# Patient Record
Sex: FEMALE | Race: WHITE | NOT HISPANIC OR LATINO | Employment: OTHER | URBAN - METROPOLITAN AREA
[De-identification: names, ages, dates, MRNs, and addresses within clinical notes are randomized per-mention and may not be internally consistent; named-entity substitution may affect disease eponyms.]

---

## 2022-04-14 ENCOUNTER — HOSPITAL ENCOUNTER (OUTPATIENT)
Facility: HOSPITAL | Age: 47
Discharge: HOME | End: 2022-04-16
Attending: EMERGENCY MEDICINE | Admitting: STUDENT IN AN ORGANIZED HEALTH CARE EDUCATION/TRAINING PROGRAM
Payer: MEDICAID

## 2022-04-14 DIAGNOSIS — R10.9 RIGHT SIDED ABDOMINAL PAIN: Primary | ICD-10-CM

## 2022-04-14 DIAGNOSIS — K80.50 BILIARY COLIC: ICD-10-CM

## 2022-04-14 DIAGNOSIS — R10.11 RIGHT UPPER QUADRANT ABDOMINAL PAIN: ICD-10-CM

## 2022-04-14 DIAGNOSIS — Z91.89 AT RISK FOR LONG QT SYNDROME: ICD-10-CM

## 2022-04-14 DIAGNOSIS — R07.9 CHEST PAIN: ICD-10-CM

## 2022-04-14 PROBLEM — I50.9 CHRONIC CONGESTIVE HEART FAILURE: Chronic | Status: ACTIVE | Noted: 2022-04-14

## 2022-04-14 PROBLEM — Q67.5 CONGENITAL POSTURAL SCOLIOSIS: Status: ACTIVE | Noted: 2022-04-14

## 2022-04-14 PROBLEM — N20.0 LEFT NEPHROLITHIASIS: Status: ACTIVE | Noted: 2022-04-14

## 2022-04-14 PROBLEM — M25.559 HIP PAIN: Status: ACTIVE | Noted: 2022-04-14

## 2022-04-14 PROBLEM — R10.31 RIGHT LOWER QUADRANT ABDOMINAL PAIN: Status: ACTIVE | Noted: 2022-04-14

## 2022-04-14 PROBLEM — E21.0 HYPERPARATHYROIDISM, PRIMARY: Status: ACTIVE | Noted: 2022-04-14

## 2022-04-14 PROBLEM — N30.01 ACUTE CYSTITIS WITH HEMATURIA: Status: ACTIVE | Noted: 2022-04-14

## 2022-04-14 PROBLEM — M54.50 LOW BACK PAIN: Status: ACTIVE | Noted: 2022-04-14

## 2022-04-14 PROBLEM — S23.9XXA THORACIC BACK SPRAIN: Status: ACTIVE | Noted: 2022-04-14

## 2022-04-14 PROBLEM — M47.816 LUMBAR SPONDYLOSIS: Status: ACTIVE | Noted: 2022-04-14

## 2022-04-14 LAB
ALBUMIN SERPL BCP-MCNC: 3.3 G/DL (ref 3.5–5.2)
ALP SERPL-CCNC: 133 U/L (ref 55–135)
ALT SERPL W/O P-5'-P-CCNC: 10 U/L (ref 10–44)
ANION GAP SERPL CALC-SCNC: 9 MMOL/L (ref 8–16)
AST SERPL-CCNC: 18 U/L (ref 10–40)
B-HCG UR QL: NEGATIVE
BACTERIA #/AREA URNS AUTO: ABNORMAL /HPF
BASOPHILS # BLD AUTO: 0.06 K/UL (ref 0–0.2)
BASOPHILS NFR BLD: 0.6 % (ref 0–1.9)
BILIRUB SERPL-MCNC: 0.5 MG/DL (ref 0.1–1)
BILIRUB UR QL STRIP: NEGATIVE
BNP SERPL-MCNC: 31 PG/ML (ref 0–99)
BUN SERPL-MCNC: 10 MG/DL (ref 6–20)
CALCIUM SERPL-MCNC: 9.7 MG/DL (ref 8.7–10.5)
CHLORIDE SERPL-SCNC: 97 MMOL/L (ref 95–110)
CLARITY UR REFRACT.AUTO: ABNORMAL
CO2 SERPL-SCNC: 29 MMOL/L (ref 23–29)
COLOR UR AUTO: YELLOW
CREAT SERPL-MCNC: 0.6 MG/DL (ref 0.5–1.4)
CTP QC/QA: YES
DIFFERENTIAL METHOD: ABNORMAL
EOSINOPHIL # BLD AUTO: 0 K/UL (ref 0–0.5)
EOSINOPHIL NFR BLD: 0 % (ref 0–8)
ERYTHROCYTE [DISTWIDTH] IN BLOOD BY AUTOMATED COUNT: 15.3 % (ref 11.5–14.5)
EST. GFR  (AFRICAN AMERICAN): >60 ML/MIN/1.73 M^2
EST. GFR  (NON AFRICAN AMERICAN): >60 ML/MIN/1.73 M^2
GLUCOSE SERPL-MCNC: 115 MG/DL (ref 70–110)
GLUCOSE UR QL STRIP: NEGATIVE
HCT VFR BLD AUTO: 34 % (ref 37–48.5)
HGB BLD-MCNC: 10.9 G/DL (ref 12–16)
HGB UR QL STRIP: ABNORMAL
HYALINE CASTS UR QL AUTO: 0 /LPF
IMM GRANULOCYTES # BLD AUTO: 0.12 K/UL (ref 0–0.04)
IMM GRANULOCYTES NFR BLD AUTO: 1.1 % (ref 0–0.5)
KETONES UR QL STRIP: ABNORMAL
LEUKOCYTE ESTERASE UR QL STRIP: ABNORMAL
LIPASE SERPL-CCNC: 10 U/L (ref 4–60)
LYMPHOCYTES # BLD AUTO: 1.7 K/UL (ref 1–4.8)
LYMPHOCYTES NFR BLD: 15.9 % (ref 18–48)
MCH RBC QN AUTO: 25.5 PG (ref 27–31)
MCHC RBC AUTO-ENTMCNC: 32.1 G/DL (ref 32–36)
MCV RBC AUTO: 80 FL (ref 82–98)
MICROSCOPIC COMMENT: ABNORMAL
MONOCYTES # BLD AUTO: 0.6 K/UL (ref 0.3–1)
MONOCYTES NFR BLD: 5.5 % (ref 4–15)
NEUTROPHILS # BLD AUTO: 8.4 K/UL (ref 1.8–7.7)
NEUTROPHILS NFR BLD: 76.9 % (ref 38–73)
NITRITE UR QL STRIP: NEGATIVE
NRBC BLD-RTO: 0 /100 WBC
PH UR STRIP: 5 [PH] (ref 5–8)
PLATELET # BLD AUTO: 279 K/UL (ref 150–450)
PMV BLD AUTO: 9.5 FL (ref 9.2–12.9)
POTASSIUM SERPL-SCNC: 3.3 MMOL/L (ref 3.5–5.1)
PROT SERPL-MCNC: 7.2 G/DL (ref 6–8.4)
PROT UR QL STRIP: ABNORMAL
RBC # BLD AUTO: 4.27 M/UL (ref 4–5.4)
RBC #/AREA URNS AUTO: 21 /HPF (ref 0–4)
SODIUM SERPL-SCNC: 135 MMOL/L (ref 136–145)
SP GR UR STRIP: 1.03 (ref 1–1.03)
SQUAMOUS #/AREA URNS AUTO: 26 /HPF
TROPONIN I SERPL DL<=0.01 NG/ML-MCNC: 0.01 NG/ML (ref 0–0.03)
TROPONIN I SERPL DL<=0.01 NG/ML-MCNC: <0.006 NG/ML (ref 0–0.03)
URN SPEC COLLECT METH UR: ABNORMAL
WBC # BLD AUTO: 10.85 K/UL (ref 3.9–12.7)
WBC #/AREA URNS AUTO: 56 /HPF (ref 0–5)

## 2022-04-14 PROCEDURE — 99285 EMERGENCY DEPT VISIT HI MDM: CPT | Mod: 25

## 2022-04-14 PROCEDURE — 99220 PR INITIAL OBSERVATION CARE,LEVL III: ICD-10-PCS | Mod: ,,, | Performed by: HOSPITALIST

## 2022-04-14 PROCEDURE — 87088 URINE BACTERIA CULTURE: CPT | Performed by: PHYSICIAN ASSISTANT

## 2022-04-14 PROCEDURE — 83690 ASSAY OF LIPASE: CPT | Performed by: PHYSICIAN ASSISTANT

## 2022-04-14 PROCEDURE — 87205 SMEAR GRAM STAIN: CPT | Performed by: PHYSICIAN ASSISTANT

## 2022-04-14 PROCEDURE — 25000003 PHARM REV CODE 250: Performed by: PHYSICIAN ASSISTANT

## 2022-04-14 PROCEDURE — G0378 HOSPITAL OBSERVATION PER HR: HCPCS

## 2022-04-14 PROCEDURE — 63600175 PHARM REV CODE 636 W HCPCS: Performed by: PHYSICIAN ASSISTANT

## 2022-04-14 PROCEDURE — 96365 THER/PROPH/DIAG IV INF INIT: CPT

## 2022-04-14 PROCEDURE — 25000242 PHARM REV CODE 250 ALT 637 W/ HCPCS: Performed by: PHYSICIAN ASSISTANT

## 2022-04-14 PROCEDURE — 87186 SC STD MICRODIL/AGAR DIL: CPT | Performed by: PHYSICIAN ASSISTANT

## 2022-04-14 PROCEDURE — 87077 CULTURE AEROBIC IDENTIFY: CPT | Performed by: PHYSICIAN ASSISTANT

## 2022-04-14 PROCEDURE — 84484 ASSAY OF TROPONIN QUANT: CPT | Performed by: PHYSICIAN ASSISTANT

## 2022-04-14 PROCEDURE — 84484 ASSAY OF TROPONIN QUANT: CPT | Mod: 91 | Performed by: PHYSICIAN ASSISTANT

## 2022-04-14 PROCEDURE — 99284 EMERGENCY DEPT VISIT MOD MDM: CPT | Mod: ,,, | Performed by: PHYSICIAN ASSISTANT

## 2022-04-14 PROCEDURE — 96375 TX/PRO/DX INJ NEW DRUG ADDON: CPT

## 2022-04-14 PROCEDURE — 85025 COMPLETE CBC W/AUTO DIFF WBC: CPT | Performed by: PHYSICIAN ASSISTANT

## 2022-04-14 PROCEDURE — 81025 URINE PREGNANCY TEST: CPT | Performed by: PHYSICIAN ASSISTANT

## 2022-04-14 PROCEDURE — 25500020 PHARM REV CODE 255: Performed by: EMERGENCY MEDICINE

## 2022-04-14 PROCEDURE — 99284 PR EMERGENCY DEPT VISIT,LEVEL IV: ICD-10-PCS | Mod: ,,, | Performed by: PHYSICIAN ASSISTANT

## 2022-04-14 PROCEDURE — 93010 ELECTROCARDIOGRAM REPORT: CPT | Mod: ,,, | Performed by: INTERNAL MEDICINE

## 2022-04-14 PROCEDURE — 87086 URINE CULTURE/COLONY COUNT: CPT | Performed by: PHYSICIAN ASSISTANT

## 2022-04-14 PROCEDURE — 93010 EKG 12-LEAD: ICD-10-PCS | Mod: ,,, | Performed by: INTERNAL MEDICINE

## 2022-04-14 PROCEDURE — 99220 PR INITIAL OBSERVATION CARE,LEVL III: CPT | Mod: ,,, | Performed by: HOSPITALIST

## 2022-04-14 PROCEDURE — 81001 URINALYSIS AUTO W/SCOPE: CPT | Performed by: PHYSICIAN ASSISTANT

## 2022-04-14 PROCEDURE — 96361 HYDRATE IV INFUSION ADD-ON: CPT

## 2022-04-14 PROCEDURE — 93005 ELECTROCARDIOGRAM TRACING: CPT

## 2022-04-14 PROCEDURE — 96376 TX/PRO/DX INJ SAME DRUG ADON: CPT

## 2022-04-14 PROCEDURE — 83880 ASSAY OF NATRIURETIC PEPTIDE: CPT | Performed by: PHYSICIAN ASSISTANT

## 2022-04-14 PROCEDURE — 80053 COMPREHEN METABOLIC PANEL: CPT | Performed by: PHYSICIAN ASSISTANT

## 2022-04-14 RX ORDER — MORPHINE SULFATE 4 MG/ML
4 INJECTION, SOLUTION INTRAMUSCULAR; INTRAVENOUS
Status: COMPLETED | OUTPATIENT
Start: 2022-04-14 | End: 2022-04-14

## 2022-04-14 RX ORDER — METHOCARBAMOL 750 MG/1
1 TABLET, FILM COATED ORAL 3 TIMES DAILY PRN
COMMUNITY
End: 2022-04-14

## 2022-04-14 RX ORDER — SODIUM CHLORIDE 0.9 % (FLUSH) 0.9 %
10 SYRINGE (ML) INJECTION EVERY 6 HOURS PRN
Status: DISCONTINUED | OUTPATIENT
Start: 2022-04-15 | End: 2022-04-16 | Stop reason: HOSPADM

## 2022-04-14 RX ORDER — COLCHICINE 0.6 MG/1
0.6 TABLET, FILM COATED ORAL 2 TIMES DAILY
Status: DISCONTINUED | OUTPATIENT
Start: 2022-04-15 | End: 2022-04-16 | Stop reason: HOSPADM

## 2022-04-14 RX ORDER — THYROID, PORCINE 180 MG/1
1 TABLET ORAL DAILY
COMMUNITY
Start: 2022-03-26

## 2022-04-14 RX ORDER — HYDROCODONE BITARTRATE AND ACETAMINOPHEN 5; 325 MG/1; MG/1
1 TABLET ORAL EVERY 6 HOURS PRN
Status: ON HOLD | COMMUNITY
Start: 2022-04-13 | End: 2022-04-16 | Stop reason: HOSPADM

## 2022-04-14 RX ORDER — IRBESARTAN 150 MG/1
150 TABLET ORAL DAILY
COMMUNITY
Start: 2022-02-08 | End: 2022-04-14 | Stop reason: CLARIF

## 2022-04-14 RX ORDER — IBUPROFEN 200 MG
400 TABLET ORAL EVERY 8 HOURS PRN
Status: ON HOLD | COMMUNITY
End: 2022-04-16 | Stop reason: HOSPADM

## 2022-04-14 RX ORDER — ESCITALOPRAM OXALATE 10 MG/1
10 TABLET ORAL DAILY
Status: DISCONTINUED | OUTPATIENT
Start: 2022-04-15 | End: 2022-04-16 | Stop reason: HOSPADM

## 2022-04-14 RX ORDER — FUROSEMIDE 10 MG/ML
80 INJECTION INTRAMUSCULAR; INTRAVENOUS DAILY
Status: DISCONTINUED | OUTPATIENT
Start: 2022-04-15 | End: 2022-04-15

## 2022-04-14 RX ORDER — METOLAZONE 2.5 MG/1
1 TABLET ORAL
COMMUNITY
Start: 2022-01-19 | End: 2022-04-14 | Stop reason: CLARIF

## 2022-04-14 RX ORDER — THYROID 60 MG/1
180 TABLET ORAL DAILY
Status: DISCONTINUED | OUTPATIENT
Start: 2022-04-15 | End: 2022-04-16 | Stop reason: HOSPADM

## 2022-04-14 RX ORDER — PROCHLORPERAZINE EDISYLATE 5 MG/ML
5 INJECTION INTRAMUSCULAR; INTRAVENOUS EVERY 6 HOURS PRN
Status: DISCONTINUED | OUTPATIENT
Start: 2022-04-15 | End: 2022-04-16 | Stop reason: HOSPADM

## 2022-04-14 RX ORDER — ONDANSETRON 2 MG/ML
4 INJECTION INTRAMUSCULAR; INTRAVENOUS EVERY 8 HOURS PRN
Status: DISCONTINUED | OUTPATIENT
Start: 2022-04-15 | End: 2022-04-15 | Stop reason: ALTCHOICE

## 2022-04-14 RX ORDER — KETOROLAC TROMETHAMINE 30 MG/ML
10 INJECTION, SOLUTION INTRAMUSCULAR; INTRAVENOUS
Status: COMPLETED | OUTPATIENT
Start: 2022-04-14 | End: 2022-04-14

## 2022-04-14 RX ORDER — ASPIRIN 325 MG
325 TABLET ORAL
Status: COMPLETED | OUTPATIENT
Start: 2022-04-14 | End: 2022-04-14

## 2022-04-14 RX ORDER — MORPHINE SULFATE 2 MG/ML
6 INJECTION, SOLUTION INTRAMUSCULAR; INTRAVENOUS
Status: DISCONTINUED | OUTPATIENT
Start: 2022-04-14 | End: 2022-04-14

## 2022-04-14 RX ORDER — AMOXICILLIN 250 MG
1 CAPSULE ORAL 2 TIMES DAILY
Status: DISCONTINUED | OUTPATIENT
Start: 2022-04-15 | End: 2022-04-16 | Stop reason: HOSPADM

## 2022-04-14 RX ORDER — NYSTATIN AND TRIAMCINOLONE ACETONIDE 100000; 1 [USP'U]/G; MG/G
CREAM TOPICAL DAILY
COMMUNITY
Start: 2022-02-11 | End: 2022-04-14

## 2022-04-14 RX ORDER — TORSEMIDE 20 MG/1
20 TABLET ORAL 2 TIMES DAILY
Status: DISCONTINUED | OUTPATIENT
Start: 2022-04-16 | End: 2022-04-16 | Stop reason: HOSPADM

## 2022-04-14 RX ORDER — KETOROLAC TROMETHAMINE 10 MG/1
10 TABLET, FILM COATED ORAL EVERY 6 HOURS PRN
COMMUNITY
Start: 2022-04-13

## 2022-04-14 RX ORDER — PHENTERMINE HYDROCHLORIDE 37.5 MG/1
37.5 TABLET ORAL DAILY
COMMUNITY
Start: 2022-03-09

## 2022-04-14 RX ORDER — ENOXAPARIN SODIUM 100 MG/ML
40 INJECTION SUBCUTANEOUS EVERY 12 HOURS
Status: DISCONTINUED | OUTPATIENT
Start: 2022-04-15 | End: 2022-04-16 | Stop reason: HOSPADM

## 2022-04-14 RX ORDER — BUMETANIDE 1 MG/1
1 TABLET ORAL 2 TIMES DAILY
COMMUNITY
Start: 2022-01-04 | End: 2022-04-14 | Stop reason: CLARIF

## 2022-04-14 RX ORDER — CEFUROXIME AXETIL 500 MG/1
500 TABLET ORAL 2 TIMES DAILY
COMMUNITY
Start: 2022-04-13

## 2022-04-14 RX ORDER — MORPHINE SULFATE 4 MG/ML
4 INJECTION, SOLUTION INTRAMUSCULAR; INTRAVENOUS EVERY 4 HOURS PRN
Status: DISCONTINUED | OUTPATIENT
Start: 2022-04-15 | End: 2022-04-16

## 2022-04-14 RX ORDER — HYDROCORTISONE 25 MG/G
CREAM TOPICAL 2 TIMES DAILY
COMMUNITY
Start: 2022-02-27 | End: 2022-04-14

## 2022-04-14 RX ORDER — ESCITALOPRAM OXALATE 10 MG/1
10 TABLET ORAL DAILY
COMMUNITY
Start: 2022-04-10

## 2022-04-14 RX ORDER — LOSARTAN POTASSIUM 100 MG/1
100 TABLET ORAL DAILY
COMMUNITY
Start: 2022-02-15 | End: 2022-04-14

## 2022-04-14 RX ORDER — CINACALCET 30 MG/1
30 TABLET, FILM COATED ORAL 2 TIMES DAILY
Status: DISCONTINUED | OUTPATIENT
Start: 2022-04-15 | End: 2022-04-16 | Stop reason: HOSPADM

## 2022-04-14 RX ORDER — TALC
6 POWDER (GRAM) TOPICAL NIGHTLY PRN
Status: DISCONTINUED | OUTPATIENT
Start: 2022-04-15 | End: 2022-04-16 | Stop reason: HOSPADM

## 2022-04-14 RX ORDER — ACETAMINOPHEN 325 MG/1
650 TABLET ORAL EVERY 4 HOURS PRN
Status: DISCONTINUED | OUTPATIENT
Start: 2022-04-15 | End: 2022-04-16

## 2022-04-14 RX ORDER — OMEPRAZOLE 40 MG/1
40 CAPSULE, DELAYED RELEASE ORAL DAILY
COMMUNITY
Start: 2022-03-26

## 2022-04-14 RX ORDER — NITROGLYCERIN 0.4 MG/1
0.4 TABLET SUBLINGUAL
Status: COMPLETED | OUTPATIENT
Start: 2022-04-14 | End: 2022-04-14

## 2022-04-14 RX ORDER — FUROSEMIDE 40 MG/1
40 TABLET ORAL DAILY
COMMUNITY
Start: 2021-11-28 | End: 2022-04-14 | Stop reason: CLARIF

## 2022-04-14 RX ORDER — POTASSIUM CHLORIDE 1500 MG/1
20 TABLET, EXTENDED RELEASE ORAL DAILY
COMMUNITY
Start: 2022-04-07

## 2022-04-14 RX ORDER — NALOXONE HCL 0.4 MG/ML
0.02 VIAL (ML) INJECTION
Status: DISCONTINUED | OUTPATIENT
Start: 2022-04-15 | End: 2022-04-16 | Stop reason: HOSPADM

## 2022-04-14 RX ORDER — POLYETHYLENE GLYCOL 3350 17 G/17G
17 POWDER, FOR SOLUTION ORAL DAILY
Status: DISCONTINUED | OUTPATIENT
Start: 2022-04-15 | End: 2022-04-16 | Stop reason: HOSPADM

## 2022-04-14 RX ORDER — PANTOPRAZOLE SODIUM 40 MG/1
40 TABLET, DELAYED RELEASE ORAL DAILY
Status: DISCONTINUED | OUTPATIENT
Start: 2022-04-15 | End: 2022-04-16 | Stop reason: HOSPADM

## 2022-04-14 RX ORDER — CINACALCET 30 MG/1
30 TABLET, FILM COATED ORAL 2 TIMES DAILY
COMMUNITY
Start: 2022-04-01

## 2022-04-14 RX ORDER — TORSEMIDE 20 MG/1
20 TABLET ORAL 2 TIMES DAILY
COMMUNITY
Start: 2022-03-18

## 2022-04-14 RX ORDER — ALLOPURINOL 300 MG/1
300 TABLET ORAL DAILY
COMMUNITY
Start: 2022-04-01

## 2022-04-14 RX ORDER — ALLOPURINOL 300 MG/1
300 TABLET ORAL DAILY
Status: DISCONTINUED | OUTPATIENT
Start: 2022-04-15 | End: 2022-04-16 | Stop reason: HOSPADM

## 2022-04-14 RX ORDER — COLCHICINE 0.6 MG/1
1 CAPSULE ORAL 2 TIMES DAILY
COMMUNITY
Start: 2021-10-22

## 2022-04-14 RX ORDER — ONDANSETRON 2 MG/ML
4 INJECTION INTRAMUSCULAR; INTRAVENOUS
Status: COMPLETED | OUTPATIENT
Start: 2022-04-14 | End: 2022-04-14

## 2022-04-14 RX ORDER — POTASSIUM CHLORIDE 20 MEQ/1
20 TABLET, EXTENDED RELEASE ORAL DAILY
Status: DISCONTINUED | OUTPATIENT
Start: 2022-04-15 | End: 2022-04-16 | Stop reason: HOSPADM

## 2022-04-14 RX ADMIN — MORPHINE SULFATE 4 MG: 4 INJECTION INTRAVENOUS at 08:04

## 2022-04-14 RX ADMIN — IOHEXOL 100 ML: 350 INJECTION, SOLUTION INTRAVENOUS at 04:04

## 2022-04-14 RX ADMIN — KETOROLAC TROMETHAMINE 10 MG: 30 INJECTION, SOLUTION INTRAMUSCULAR at 03:04

## 2022-04-14 RX ADMIN — ASPIRIN 325 MG ORAL TABLET 325 MG: 325 PILL ORAL at 09:04

## 2022-04-14 RX ADMIN — MORPHINE SULFATE 4 MG: 4 INJECTION INTRAVENOUS at 03:04

## 2022-04-14 RX ADMIN — CEFTRIAXONE 1 G: 1 INJECTION, SOLUTION INTRAVENOUS at 05:04

## 2022-04-14 RX ADMIN — ONDANSETRON 4 MG: 2 INJECTION INTRAMUSCULAR; INTRAVENOUS at 03:04

## 2022-04-14 RX ADMIN — NITROGLYCERIN 0.4 MG: 0.4 TABLET, ORALLY DISINTEGRATING SUBLINGUAL at 09:04

## 2022-04-14 RX ADMIN — SODIUM CHLORIDE 250 ML: 0.9 INJECTION, SOLUTION INTRAVENOUS at 03:04

## 2022-04-14 NOTE — HPI
Genesis Amezcua is a 46 y.o. female with a history of super morbid obesity (BMI 68), CHF with pacemaker (no recent echo in our system), GERD, gout who presented to the ED this afternoon with ongoing right sided abdominal pain for the last 3-4 days. She states that she started having periumbilical pain about 3 days ago that radiated down to her lower abdomen, up to her RUQ and around her right back. She states that it initially was intermittent but has become constant with 'sharp waves'. She complains of bloating and nausea which have worsened today. No emesis. She states was recently diagnosed with a left kidney stone and is taking antibiotics for a UTI. Denies any prior episodes similar to this. States she knows she has gallstones and this 'feels different than this type of pain'. Denies fever but complains of chills. CT performed showed biliary sludge with possible cholelithiasis but with no evidence of cholecystitis. No leukocytosis. LFTs wnl. General surgery consult for evaluation of possible biliary colic.     History of 2 previous c-sections, no prior abdominal surgeries. Not able to perform >4 METS.

## 2022-04-14 NOTE — CONSULTS
[facility] - Emergency Dept  General Surgery  Consult Note    Patient Name: Genesis Amezcua  MRN: [id number]  Code Status: No Order  Admission Date: 4/14/2022  Hospital Length of Stay: 0 days  Attending Physician: Tai Whitehead MD  Primary Care Provider: Primary Doctor No    Patient information was obtained from patient and ER records.     Inpatient consult to General surgery  Consult performed by: Radha Gomez MD  Consult ordered by: Deirdre Epps PA-C        Subjective:     Principal Problem: Biliary colic    History of Present Illness: Genesis Amezcua is a 46 y.o. female with a history of super morbid obesity (BMI 68), CHF with pacemaker (no recent echo in our system), GERD, gout who presented to the ED this afternoon with ongoing right sided abdominal pain for the last 3-4 days. She states that she started having periumbilical pain about 3 days ago that radiated down to her lower abdomen, up to her RUQ and around her right back. She states that it initially was intermittent but has become constant with 'sharp waves'. She complains of bloating and nausea which have worsened today. No emesis. She states was recently diagnosed with a left kidney stone and is taking antibiotics for a UTI. Denies any prior episodes similar to this. States she knows she has gallstones and this 'feels different than this type of pain'. Denies fever but complains of chills. CT performed showed biliary sludge with possible cholelithiasis but with no evidence of cholecystitis. No leukocytosis. LFTs wnl. General surgery consult for evaluation of possible biliary colic.     History of 2 previous c-sections, no prior abdominal surgeries. Not able to perform >4 METS.       No current facility-administered medications on file prior to encounter.     No current outpatient medications on file prior to encounter.       Review of patient's allergies indicates:   Allergen Reactions    Penicillins Hives       No past medical history on file.  No past  surgical history on file.  Family History    None       Tobacco Use    Smoking status: Not on file    Smokeless tobacco: Not on file   Substance and Sexual Activity    Alcohol use: Not on file    Drug use: Not on file    Sexual activity: Not on file     Review of Systems   Constitutional:  Negative for activity change, appetite change, chills, fatigue and fever.   HENT:  Negative for congestion, ear pain, rhinorrhea and sore throat.    Eyes:  Negative for pain, discharge and visual disturbance.   Respiratory:  Positive for shortness of breath. Negative for cough and chest tightness.    Cardiovascular:  Negative for chest pain and palpitations.   Gastrointestinal:  Positive for abdominal pain and nausea. Negative for abdominal distention, blood in stool, constipation, diarrhea and vomiting.   Genitourinary:  Negative for difficulty urinating.   Musculoskeletal:  Negative for back pain and neck pain.   Skin:  Negative for color change and rash.   Neurological:  Negative for dizziness, numbness and headaches.   Hematological:  Negative for adenopathy.   Psychiatric/Behavioral: Negative.     Objective:     Vital Signs (Most Recent):  Temp: 98.3 °F (36.8 °C) (04/14/22 1741)  Pulse: 88 (04/14/22 1741)  Resp: 20 (04/14/22 1741)  BP: (!) 161/91 (04/14/22 1741)  SpO2: 98 % (04/14/22 1741)   Vital Signs (24h Range):  Temp:  [98.1 °F (36.7 °C)-98.3 °F (36.8 °C)] 98.3 °F (36.8 °C)  Pulse:  [] 88  Resp:  [16-20] 20  SpO2:  [97 %-98 %] 98 %  BP: (154-161)/(70-91) 161/91     Weight: (!) 174 kg (383 lb 9.6 oz)  Body mass index is 67.95 kg/m².    Physical Exam  Constitutional:       General: She is not in acute distress.     Appearance: She is well-developed. She is obese. She is not toxic-appearing.   HENT:      Head: Normocephalic and atraumatic.      Nose: Nose normal.   Eyes:      General: No scleral icterus.     Conjunctiva/sclera: Conjunctivae normal.      Pupils: Pupils are equal, round, and reactive to light.    Neck:      Thyroid: No thyromegaly.   Cardiovascular:      Rate and Rhythm: Normal rate and regular rhythm.   Pulmonary:      Effort: Pulmonary effort is normal. No respiratory distress.   Abdominal:      General: There is no distension.      Palpations: Abdomen is soft.      Tenderness: There is abdominal tenderness (Right lower abdominal/flank tenderness, no significant RUQ tenderness).   Musculoskeletal:         General: Normal range of motion.      Cervical back: Normal range of motion.   Lymphadenopathy:      Cervical: No cervical adenopathy.   Skin:     General: Skin is warm and dry.      Findings: No rash.   Neurological:      Mental Status: She is alert and oriented to person, place, and time.       Significant Labs:  I have reviewed all pertinent lab results within the past 24 hours.  CBC:   Recent Labs   Lab 04/14/22  1511   WBC 10.85   RBC 4.27   HGB 10.9*   HCT 34.0*      MCV 80*   MCH 25.5*   MCHC 32.1     CMP:   Recent Labs   Lab 04/14/22  1511   *   CALCIUM 9.7   ALBUMIN 3.3*   PROT 7.2   *   K 3.3*   CO2 29   CL 97   BUN 10   CREATININE 0.6   ALKPHOS 133   ALT 10   AST 18   BILITOT 0.5       Significant Diagnostics:  I have reviewed all pertinent imaging results/findings within the past 24 hours.      Assessment/Plan:     * Biliary colic  46 y.o. female with a history of super morbid obesity (BMI 68), CHF with pacemaker (no recent echo in our system), GERD, gout who presented to the ED this afternoon with ongoing right sided abdominal pain for the last 3-4 days. General surgery consulted for evaluation given concern for biliary colic.     - Patient seen and examined, labs and imaging reviewed  - Given patient's morbid obesity with BMI of 68 and known CHF with pacemaker without any cardiac workup in our system her risk for a semi-elective procedure would be prohibitively high. Additionally, her pain is more RLQ than RUQ so my suspicion for biliary pathology is low.  If needed,  patient can be admitted to medicine for assistance with pain control. If concern for cholecystitis exists recommend obtaining HIDA scan. If positive we could consider cholecystectomy, though more likely would recommend epi tube at this time  - Please call with questions or concerns        VTE Risk Mitigation (From admission, onward)    None          Thank you for your consult. I will sign off. Please contact us if you have any additional questions.    Radha Gomez MD  General Surgery  [facility] - Emergency Dept

## 2022-04-14 NOTE — ED PROVIDER NOTES
Encounter Date: 4/14/2022       History     Chief Complaint   Patient presents with    Abdominal Pain     46-year-old female with past medical history of CHF, pacemaker, GERD, gout, morbid obesity, who presents to the ED with chief complaint of right sided abdominal pain that radiates to the back that started three days ago. Pain was initially intermittent but has become more constant over the last day. She additionally complains of chest pain and shortness of breath. She was evaluated twice at [facility] ED. She was diagnosed with gallstones and left ureteral kidney stone. She has no pain on the left side of her abdomen or left flank. She endorses nausea but denies vomiting, diarrhea, or dysuria. Denies fever. Taking norco and toradol at home without any improvement of her symptoms. Reports 2 C-sections in the past but denies other abdominal surgeries. Denies other worsening or alleviating factors.         Review of patient's allergies indicates:   Allergen Reactions    Penicillins Hives     No past medical history on file.  No past surgical history on file.  No family history on file.     Review of Systems   Constitutional: Negative for fever.   HENT: Negative for sore throat.    Respiratory: Positive for shortness of breath.    Cardiovascular: Positive for chest pain.   Gastrointestinal: Positive for abdominal pain and nausea. Negative for vomiting.   Genitourinary: Negative for dysuria.   Musculoskeletal: Negative for back pain.   Skin: Negative for rash.   Neurological: Negative for weakness.   Hematological: Does not bruise/bleed easily.       Physical Exam     Initial Vitals [04/14/22 1306]   BP Pulse Resp Temp SpO2   (!) 154/70 (!) 112 18 98.1 °F (36.7 °C) 97 %      MAP       --         Physical Exam    Nursing note and vitals reviewed.  Constitutional: She appears well-developed and well-nourished. She is not diaphoretic. No distress.   Appears uncomfortable    HENT:   Head: Normocephalic and  atraumatic.   Mouth/Throat: Oropharynx is clear and moist.   Eyes: Conjunctivae and EOM are normal. Pupils are equal, round, and reactive to light.   Neck: Neck supple.   Normal range of motion.  Cardiovascular: Regular rhythm, normal heart sounds and intact distal pulses. Tachycardia present.  Exam reveals no gallop and no friction rub.    No murmur heard.  Pulmonary/Chest: Breath sounds normal. She has no wheezes. She has no rhonchi. She has no rales.   Abdominal: Abdomen is soft. Bowel sounds are normal. There is abdominal tenderness in the right upper quadrant and right lower quadrant. There is no rebound and no guarding.   Musculoskeletal:         General: Normal range of motion.      Cervical back: Normal range of motion and neck supple.     Neurological: She is alert and oriented to person, place, and time. She has normal strength. No cranial nerve deficit or sensory deficit. GCS score is 15. GCS eye subscore is 4. GCS verbal subscore is 5. GCS motor subscore is 6.   Skin: Skin is warm and dry. Capillary refill takes less than 2 seconds.   Psychiatric: She has a normal mood and affect. Her behavior is normal. Judgment and thought content normal.         ED Course   Procedures  Labs Reviewed   CBC W/ AUTO DIFFERENTIAL - Abnormal; Notable for the following components:       Result Value    Hemoglobin 10.9 (*)     Hematocrit 34.0 (*)     MCV 80 (*)     MCH 25.5 (*)     RDW 15.3 (*)     Immature Granulocytes 1.1 (*)     Gran # (ANC) 8.4 (*)     Immature Grans (Abs) 0.12 (*)     Gran % 76.9 (*)     Lymph % 15.9 (*)     All other components within normal limits   COMPREHENSIVE METABOLIC PANEL - Abnormal; Notable for the following components:    Sodium 135 (*)     Potassium 3.3 (*)     Glucose 115 (*)     Albumin 3.3 (*)     All other components within normal limits   LIPASE   TROPONIN I   B-TYPE NATRIURETIC PEPTIDE   URINALYSIS, REFLEX TO URINE CULTURE   POCT URINE PREGNANCY     EKG Readings: (Independently  Interpreted)   Initial Reading: No STEMI. Rhythm: Normal Sinus Rhythm. Heart Rate: 83.     ECG Results          EKG 12-lead (Final result)  Result time 04/14/22 15:40:10    Final result by Interface, Lab In [facility] (04/14/22 15:40:10)                 Narrative:    Test Reason : R07.9,    Vent. Rate : 083 BPM     Atrial Rate : 083 BPM     P-R Int : 164 ms          QRS Dur : 088 ms      QT Int : 426 ms       P-R-T Axes : 048 036 036 degrees     QTc Int : 500 ms    Normal sinus rhythm  Prolonged QT  Abnormal ECG  No previous ECGs available  Confirmed by Troy MAYO MD ([id number]) on 4/14/2022 3:40:02 PM    Referred By:             Confirmed By:Troy MAYO MD                            Imaging Results          X-Ray Chest PA And Lateral (In process)  Result time 04/14/22 16:14:54               CT Abdomen Pelvis With Contrast (In process)  Result time 04/14/22 16:09:50                 Medications   sodium chloride 0.9% bolus 250 mL (250 mLs Intravenous New Bag 4/14/22 1554)   ondansetron injection 4 mg (4 mg Intravenous Given 4/14/22 1550)   ketorolac injection 9.999 mg (9.999 mg Intravenous Given 4/14/22 1551)   morphine injection 4 mg (4 mg Intravenous Given 4/14/22 1548)   iohexoL (OMNIPAQUE 350) injection 100 mL (100 mLs Intravenous Given 4/14/22 1602)     Medical Decision Making:   History:   Old Medical Records: I decided to obtain old medical records.  Initial Assessment:   Emergent evaluation of a 46-year-old female who presents to the emergency department with chief complaint of right-sided abdominal pain that radiates to the back, nausea that began 3 days ago.  She was evaluated at [facility] for these complaints.  She was diagnosed with gallstones and left ureteral stone.  She was discharged with Norco and Toradol, which she has been taking without any significant improvement.  She additionally complains of chest pain and shortness of breath that began today.  Patient is afebrile, tachycardic, and nontoxic  appearing.  Will order labs, imaging, analgesia, antiemetic, small fluid bolus, and reassess.  Differential Diagnosis:   Differential diagnosis includes but is not limited to symptomatic cholelithiasis, cholecystitis, gastritis, appendicitis, CHF exacerbation, urinary tract infection.  Independently Interpreted Test(s):   I have ordered and independently interpreted X-rays - see prior notes.  I have ordered and independently interpreted EKG Reading(s) - see prior notes  Clinical Tests:   Lab Tests: Ordered and Reviewed  Radiological Study: Ordered and Reviewed  Medical Tests: Ordered and Reviewed  ED Management:  No severe hematologic derangements.  Slight hypokalemia at 3.3.  Creatinine 0.6.  Normal LFTs.  UPT negative.  Lipase 10. Troponin negative.  BNP within normal limits.  Urinalysis, chest x-ray, CT abdomen pelvis pending.  Due to shift change, will sign out to oncoming ED provider will continue to monitor until final disposition reached.  Suspect patient will likely need general surgery consult for symptomatic cholelithiasis, if patient's reported history is correct.   The patient's history, physical exam, and plan of care was discussed with and agreed upon with my supervising physician.               Attending Attestation:     Physician Attestation Statement for NP/PA:   I have conducted a face to face encounter with this patient in addition to the NP/PA, due to Medical Complexity    Other NP/PA Attestation Additions:    History of Present Illness: Persistent abdominal pain for past two days, severe, diagnosed with gallstones at outside hospital without cholecystitis.   Physical Exam: Diffuse abdominal tenderness on my exam.   Medical Decision Making: Possible UTI, will cover with ceftriaxone though it was somewhat of a dirty catch so will see what cultures show.  CT abd consistent with cholelithiasis without cholecystitis, no other acute abdominal findings to explain symptoms.  CXR suggestive of possible  small lung mass so chest CT done concerning for infection vs malignancy.  Will need further workup.  Pt notified of these findings. General surgery feels pt is not candidate for cholecystectomy if this is intractable biliary colic due to comorbidities at this time and recommends admit to medicine.  Pt admitted to medicine service.  While awaiting admission she had brief period of substernal chest pain, EKG done at the time showed no evidence of ischemia.  Troponin was ordered and I requested nurse to notify inpatient team.  Nitro tab given and aspirin given.  Pain had resolved on re-evaluation.             ED Course as of 04/14/22 1616   Thu Apr 14, 2022   1538 WBC: 10.85 [JM]   1538 Hemoglobin(!): 10.9 [JM]   1538 Hematocrit(!): 34.0 [JM]   1538 Platelets: 279 [JM]   1552 Glucose(!): 115 [JM]   1552 BUN: 10 [JM]   1552 Creatinine: 0.6 [JM]   1552 Troponin I: 0.006 [JM]   1552 Lipase: 10 [JM]   1552 BNP: 31 [JM]   1552 Preg Test, Ur: Negative [JM]      ED Course User Index  [JM] Freya Jarvis PA-C             Clinical Impression:   Final diagnoses:  [R07.9] Chest pain  [R10.11] Right upper quadrant abdominal pain  [R10.9] Right sided abdominal pain (Primary)                 Freya Jarvis PA-C  04/14/22 1616       Tai Whitehead MD  04/15/22 [id number]

## 2022-04-14 NOTE — ASSESSMENT & PLAN NOTE
46 y.o. female with a history of super morbid obesity (BMI 68), CHF with pacemaker (no recent echo in our system), GERD, gout who presented to the ED this afternoon with ongoing right sided abdominal pain for the last 3-4 days. General surgery consulted for evaluation given concern for biliary colic.     - Patient seen and examined, labs and imaging reviewed  - Given patient's morbid obesity with BMI of 68 and known CHF with pacemaker without any cardiac workup in our system her risk for a semi-elective procedure would be prohibitively high. Additionally, her pain is more RLQ than RUQ so my suspicion for biliary pathology is low.  If needed, patient can be admitted to medicine for assistance with pain control. If concern for cholecystitis exists recommend obtaining HIDA scan. If positive we could consider cholecystectomy, though more likely would recommend epi tube at this time  - Please call with questions or concerns

## 2022-04-14 NOTE — ED TRIAGE NOTES
Genesis Amezcua, a 46 y.o. female presents to the ED w/ complaint of abd pain since Monday. Pt states that she was d/c'ed from  yesterday and was told that she has kidney stones in L kidney, as well as gallstones. Pt reports 10/10 pain in R general quadrant of abdomen. Pt reports nausea and vomiting, chest pain, SOB, HA. Pt denies diarrhea, constipation. Pt has PMH of CHF with pacemaker.     Triage note:  Chief Complaint   Patient presents with    Abdominal Pain     Review of patient's allergies indicates:  Not on File  No past medical history on file.    LOC: The patient is awake, alert, and oriented to self, place, time, and situation. Pt is calm and cooperative. Pt is tearful.  Speech is appropriate and clear.     APPEARANCE: Patient is tearful and in acute distress.  Patient is clean and well groomed.    SKIN: The skin is warm and dry; color consistent with ethnicity.  Patient has normal skin turgor and moist mucus membranes.  Skin intact; no breakdown or bruising noted.     MUSCULOSKELETAL: Patient moving bilateral lower extremities with difficulty; denies pain in the extremities or back.  Reports weakness.     RESPIRATORY: Airway is open and patent. Respirations spontaneous, even, easy, and non-labored.  Patient has a normal effort and rate.  No accessory muscle use noted. Denies cough. Reports SOB due to pain.     CARDIAC:  Normal rate noted.  No peripheral edema noted. No complaints of chest pain.      ABDOMEN: Tender to palpation.  Distention noted. Pt reports abdominal pain in LLQ; reports nausea, vomiting; denies diarrhea, or constipation.    NEUROLOGIC: Eyes open spontaneously.  Behavior appropriate to situation.  Follows commands; facial expression symmetrical.  Purposeful motor response noted; normal sensation in all extremities. Pt reports headache; denies lightheadedness or dizziness; denies visual disturbances; denies loss of balance; denies unilateral weakness.

## 2022-04-14 NOTE — SUBJECTIVE & OBJECTIVE
No current facility-administered medications on file prior to encounter.     No current outpatient medications on file prior to encounter.       Review of patient's allergies indicates:   Allergen Reactions    Penicillins Hives       No past medical history on file.  No past surgical history on file.  Family History    None       Tobacco Use    Smoking status: Not on file    Smokeless tobacco: Not on file   Substance and Sexual Activity    Alcohol use: Not on file    Drug use: Not on file    Sexual activity: Not on file     Review of Systems   Constitutional:  Negative for activity change, appetite change, chills, fatigue and fever.   HENT:  Negative for congestion, ear pain, rhinorrhea and sore throat.    Eyes:  Negative for pain, discharge and visual disturbance.   Respiratory:  Positive for shortness of breath. Negative for cough and chest tightness.    Cardiovascular:  Negative for chest pain and palpitations.   Gastrointestinal:  Positive for abdominal pain and nausea. Negative for abdominal distention, blood in stool, constipation, diarrhea and vomiting.   Genitourinary:  Negative for difficulty urinating.   Musculoskeletal:  Negative for back pain and neck pain.   Skin:  Negative for color change and rash.   Neurological:  Negative for dizziness, numbness and headaches.   Hematological:  Negative for adenopathy.   Psychiatric/Behavioral: Negative.     Objective:     Vital Signs (Most Recent):  Temp: 98.3 °F (36.8 °C) (04/14/22 1741)  Pulse: 88 (04/14/22 1741)  Resp: 20 (04/14/22 1741)  BP: (!) 161/91 (04/14/22 1741)  SpO2: 98 % (04/14/22 1741)   Vital Signs (24h Range):  Temp:  [98.1 °F (36.7 °C)-98.3 °F (36.8 °C)] 98.3 °F (36.8 °C)  Pulse:  [] 88  Resp:  [16-20] 20  SpO2:  [97 %-98 %] 98 %  BP: (154-161)/(70-91) 161/91     Weight: (!) 174 kg (383 lb 9.6 oz)  Body mass index is 67.95 kg/m².    Physical Exam  Constitutional:       General: She is not in acute distress.     Appearance: She is  well-developed. She is obese. She is not toxic-appearing.   HENT:      Head: Normocephalic and atraumatic.      Nose: Nose normal.   Eyes:      General: No scleral icterus.     Conjunctiva/sclera: Conjunctivae normal.      Pupils: Pupils are equal, round, and reactive to light.   Neck:      Thyroid: No thyromegaly.   Cardiovascular:      Rate and Rhythm: Normal rate and regular rhythm.   Pulmonary:      Effort: Pulmonary effort is normal. No respiratory distress.   Abdominal:      General: There is no distension.      Palpations: Abdomen is soft.      Tenderness: There is abdominal tenderness (Right lower abdominal/flank tenderness, no significant RUQ tenderness).   Musculoskeletal:         General: Normal range of motion.      Cervical back: Normal range of motion.   Lymphadenopathy:      Cervical: No cervical adenopathy.   Skin:     General: Skin is warm and dry.      Findings: No rash.   Neurological:      Mental Status: She is alert and oriented to person, place, and time.       Significant Labs:  I have reviewed all pertinent lab results within the past 24 hours.  CBC:   Recent Labs   Lab 04/14/22  1511   WBC 10.85   RBC 4.27   HGB 10.9*   HCT 34.0*      MCV 80*   MCH 25.5*   MCHC 32.1     CMP:   Recent Labs   Lab 04/14/22  1511   *   CALCIUM 9.7   ALBUMIN 3.3*   PROT 7.2   *   K 3.3*   CO2 29   CL 97   BUN 10   CREATININE 0.6   ALKPHOS 133   ALT 10   AST 18   BILITOT 0.5       Significant Diagnostics:  I have reviewed all pertinent imaging results/findings within the past 24 hours.

## 2022-04-15 PROBLEM — E66.01 MORBID OBESITY WITH BMI OF 60.0-69.9, ADULT: Chronic | Status: ACTIVE | Noted: 2022-04-15

## 2022-04-15 PROBLEM — E03.9 HYPOTHYROIDISM: Status: ACTIVE | Noted: 2022-04-15

## 2022-04-15 LAB
ALBUMIN SERPL BCP-MCNC: 3.1 G/DL (ref 3.5–5.2)
ANION GAP SERPL CALC-SCNC: 11 MMOL/L (ref 8–16)
BASOPHILS # BLD AUTO: 0.08 K/UL (ref 0–0.2)
BASOPHILS NFR BLD: 0.7 % (ref 0–1.9)
BUN SERPL-MCNC: 9 MG/DL (ref 6–20)
CALCIUM SERPL-MCNC: 10 MG/DL (ref 8.7–10.5)
CHLORIDE SERPL-SCNC: 97 MMOL/L (ref 95–110)
CO2 SERPL-SCNC: 30 MMOL/L (ref 23–29)
CREAT SERPL-MCNC: 0.7 MG/DL (ref 0.5–1.4)
DIFFERENTIAL METHOD: ABNORMAL
EOSINOPHIL # BLD AUTO: 0 K/UL (ref 0–0.5)
EOSINOPHIL NFR BLD: 0 % (ref 0–8)
ERYTHROCYTE [DISTWIDTH] IN BLOOD BY AUTOMATED COUNT: 15.5 % (ref 11.5–14.5)
EST. GFR  (AFRICAN AMERICAN): >60 ML/MIN/1.73 M^2
EST. GFR  (NON AFRICAN AMERICAN): >60 ML/MIN/1.73 M^2
GLUCOSE SERPL-MCNC: 121 MG/DL (ref 70–110)
GRAM STN SPEC: NORMAL
HCT VFR BLD AUTO: 34.6 % (ref 37–48.5)
HGB BLD-MCNC: 10.8 G/DL (ref 12–16)
IMM GRANULOCYTES # BLD AUTO: 0.13 K/UL (ref 0–0.04)
IMM GRANULOCYTES NFR BLD AUTO: 1.2 % (ref 0–0.5)
LYMPHOCYTES # BLD AUTO: 1.7 K/UL (ref 1–4.8)
LYMPHOCYTES NFR BLD: 15.7 % (ref 18–48)
MAGNESIUM SERPL-MCNC: 1.9 MG/DL (ref 1.6–2.6)
MCH RBC QN AUTO: 25.5 PG (ref 27–31)
MCHC RBC AUTO-ENTMCNC: 31.2 G/DL (ref 32–36)
MCV RBC AUTO: 82 FL (ref 82–98)
MONOCYTES # BLD AUTO: 0.6 K/UL (ref 0.3–1)
MONOCYTES NFR BLD: 5.5 % (ref 4–15)
NEUTROPHILS # BLD AUTO: 8.2 K/UL (ref 1.8–7.7)
NEUTROPHILS NFR BLD: 76.9 % (ref 38–73)
NRBC BLD-RTO: 0 /100 WBC
PHOSPHATE SERPL-MCNC: 2.6 MG/DL (ref 2.7–4.5)
PLATELET # BLD AUTO: 269 K/UL (ref 150–450)
PMV BLD AUTO: 9.5 FL (ref 9.2–12.9)
POCT GLUCOSE: 117 MG/DL (ref 70–110)
POTASSIUM SERPL-SCNC: 3.6 MMOL/L (ref 3.5–5.1)
PTH-INTACT SERPL-MCNC: 364.3 PG/ML (ref 9–77)
RBC # BLD AUTO: 4.23 M/UL (ref 4–5.4)
SODIUM SERPL-SCNC: 138 MMOL/L (ref 136–145)
T4 FREE SERPL-MCNC: 0.74 NG/DL (ref 0.71–1.51)
TSH SERPL DL<=0.005 MIU/L-ACNC: 6.24 UIU/ML (ref 0.4–4)
WBC # BLD AUTO: 10.67 K/UL (ref 3.9–12.7)

## 2022-04-15 PROCEDURE — 96375 TX/PRO/DX INJ NEW DRUG ADDON: CPT

## 2022-04-15 PROCEDURE — 83735 ASSAY OF MAGNESIUM: CPT | Performed by: HOSPITALIST

## 2022-04-15 PROCEDURE — 96376 TX/PRO/DX INJ SAME DRUG ADON: CPT

## 2022-04-15 PROCEDURE — G0378 HOSPITAL OBSERVATION PER HR: HCPCS

## 2022-04-15 PROCEDURE — 84439 ASSAY OF FREE THYROXINE: CPT | Performed by: HOSPITALIST

## 2022-04-15 PROCEDURE — 97535 SELF CARE MNGMENT TRAINING: CPT

## 2022-04-15 PROCEDURE — 97161 PT EVAL LOW COMPLEX 20 MIN: CPT

## 2022-04-15 PROCEDURE — 99225 PR SUBSEQUENT OBSERVATION CARE,LEVEL II: CPT | Mod: ,,, | Performed by: STUDENT IN AN ORGANIZED HEALTH CARE EDUCATION/TRAINING PROGRAM

## 2022-04-15 PROCEDURE — 85025 COMPLETE CBC W/AUTO DIFF WBC: CPT | Performed by: HOSPITALIST

## 2022-04-15 PROCEDURE — 82962 GLUCOSE BLOOD TEST: CPT

## 2022-04-15 PROCEDURE — 25000003 PHARM REV CODE 250: Performed by: HOSPITALIST

## 2022-04-15 PROCEDURE — 63600175 PHARM REV CODE 636 W HCPCS: Performed by: HOSPITALIST

## 2022-04-15 PROCEDURE — 99225 PR SUBSEQUENT OBSERVATION CARE,LEVEL II: ICD-10-PCS | Mod: ,,, | Performed by: STUDENT IN AN ORGANIZED HEALTH CARE EDUCATION/TRAINING PROGRAM

## 2022-04-15 PROCEDURE — 83970 ASSAY OF PARATHORMONE: CPT | Performed by: HOSPITALIST

## 2022-04-15 PROCEDURE — 80069 RENAL FUNCTION PANEL: CPT | Performed by: HOSPITALIST

## 2022-04-15 PROCEDURE — 96372 THER/PROPH/DIAG INJ SC/IM: CPT | Performed by: HOSPITALIST

## 2022-04-15 PROCEDURE — 99900035 HC TECH TIME PER 15 MIN (STAT)

## 2022-04-15 PROCEDURE — 97165 OT EVAL LOW COMPLEX 30 MIN: CPT

## 2022-04-15 PROCEDURE — 84443 ASSAY THYROID STIM HORMONE: CPT | Performed by: HOSPITALIST

## 2022-04-15 PROCEDURE — 97530 THERAPEUTIC ACTIVITIES: CPT

## 2022-04-15 PROCEDURE — 94761 N-INVAS EAR/PLS OXIMETRY MLT: CPT

## 2022-04-15 RX ORDER — TAMSULOSIN HYDROCHLORIDE 0.4 MG/1
0.4 CAPSULE ORAL DAILY
Status: DISCONTINUED | OUTPATIENT
Start: 2022-04-16 | End: 2022-04-15

## 2022-04-15 RX ORDER — ONDANSETRON 2 MG/ML
4 INJECTION INTRAMUSCULAR; INTRAVENOUS EVERY 8 HOURS PRN
Status: DISCONTINUED | OUTPATIENT
Start: 2022-04-15 | End: 2022-04-16 | Stop reason: HOSPADM

## 2022-04-15 RX ADMIN — SENNOSIDES AND DOCUSATE SODIUM 1 TABLET: 50; 8.6 TABLET ORAL at 10:04

## 2022-04-15 RX ADMIN — COLCHICINE 0.6 MG: 0.6 TABLET, FILM COATED ORAL at 10:04

## 2022-04-15 RX ADMIN — POTASSIUM CHLORIDE 20 MEQ: 20 TABLET, EXTENDED RELEASE ORAL at 10:04

## 2022-04-15 RX ADMIN — ENOXAPARIN SODIUM 40 MG: 100 INJECTION SUBCUTANEOUS at 12:04

## 2022-04-15 RX ADMIN — ENOXAPARIN SODIUM 40 MG: 100 INJECTION SUBCUTANEOUS at 10:04

## 2022-04-15 RX ADMIN — CINACALCET 30 MG: 30 TABLET, FILM COATED ORAL at 09:04

## 2022-04-15 RX ADMIN — MORPHINE SULFATE 4 MG: 4 INJECTION INTRAVENOUS at 05:04

## 2022-04-15 RX ADMIN — COLCHICINE 0.6 MG: 0.6 TABLET, FILM COATED ORAL at 09:04

## 2022-04-15 RX ADMIN — CEFTRIAXONE 2 G: 2 INJECTION, SOLUTION INTRAVENOUS at 04:04

## 2022-04-15 RX ADMIN — POLYETHYLENE GLYCOL 3350 17 G: 17 POWDER, FOR SOLUTION ORAL at 10:04

## 2022-04-15 RX ADMIN — ENOXAPARIN SODIUM 40 MG: 100 INJECTION SUBCUTANEOUS at 09:04

## 2022-04-15 RX ADMIN — MORPHINE SULFATE 4 MG: 4 INJECTION INTRAVENOUS at 12:04

## 2022-04-15 RX ADMIN — PANTOPRAZOLE SODIUM 40 MG: 40 TABLET, DELAYED RELEASE ORAL at 10:04

## 2022-04-15 RX ADMIN — CINACALCET 30 MG: 30 TABLET, FILM COATED ORAL at 10:04

## 2022-04-15 RX ADMIN — ESCITALOPRAM OXALATE 10 MG: 10 TABLET ORAL at 10:04

## 2022-04-15 RX ADMIN — THYROID, PORCINE 180 MG: 60 TABLET ORAL at 11:04

## 2022-04-15 RX ADMIN — SENNOSIDES AND DOCUSATE SODIUM 1 TABLET: 50; 8.6 TABLET ORAL at 12:04

## 2022-04-15 RX ADMIN — ACETAMINOPHEN 650 MG: 325 TABLET ORAL at 04:04

## 2022-04-15 RX ADMIN — SENNOSIDES AND DOCUSATE SODIUM 1 TABLET: 50; 8.6 TABLET ORAL at 09:04

## 2022-04-15 RX ADMIN — MORPHINE SULFATE 4 MG: 4 INJECTION INTRAVENOUS at 09:04

## 2022-04-15 RX ADMIN — ALLOPURINOL 300 MG: 300 TABLET ORAL at 10:04

## 2022-04-15 RX ADMIN — PROCHLORPERAZINE EDISYLATE 5 MG: 5 INJECTION INTRAMUSCULAR; INTRAVENOUS at 10:04

## 2022-04-15 RX ADMIN — MORPHINE SULFATE 4 MG: 4 INJECTION INTRAVENOUS at 10:04

## 2022-04-15 NOTE — PROGRESS NOTES
[facility] Medicine  Progress Note    Patient Name: Genesis Amezcua  MRN: [id number]  Patient Class: OP- Observation   Admission Date: 4/14/2022  Length of Stay: 0 days  Attending Physician: Alberto Medrano MD  Primary Care Provider: Matthew Martel MD        Subjective:     Principal Problem:Acute cystitis with hematuria        HPI:  46-year-old WF, history of Congestive Heart Failure status post pacer placement , obesity BMI (66). Hypothyroidism, Hyperparathyroidism, Gout, GERD presents with complaints of right lower quadrant abdominal pain.     She is followed for her care within the [facility].  She reports on April 12th, got out of bed and felt overall pain that she initially thought was hunger pains verses a muscular pain in the abdomen, the pain progressively got worsened Tuesday night at 10:00 p.m. she presented to [facility], states that she had multiple tests done including lab studies and imaging was told that she had stones in her gallbladder and ultimately was discharged at 4:00 a.m. Wednesday morning.  By 10:00 a.m. her symptoms had gotten worsened pain was rated 10/10 so she return to the emergency department, reporting that she underwent further testing, kidney stones noted in left kidney, also has been told she has urinary tract her bladder infection and was prescribed oral antibiotic, oral Toradol, Norco and again discharge.  A she was advised to see a urologist to she saw earlier on Thursday who reported that he did not have any other management or treatment to offer patient at this time, and he did not believe patient has right-sided pain may be due to left-sided kidney stones.    [facility] hoping for further assistance in this workup.  She has undergone CT abdomen pelvis that demonstrates sludge in the gallbladder without discrete stones no biliary ductal abnormalities, and left-sided nonobstructive kidney stones.   General surgery had evaluated patient recommends no operative intervention at this time based on findings.  Patient has no abnormal cholestatic markers on lab workup and a normal lipase.    Incidental finding on CT chest patient has a left lower lobe nodular finding read as possible infection verse malignancy or in inflammatory component, patient in notified of results states she has not been told of any abnormal chest findings on any prior CT scan of the chest but unclear when last examination management, as chest x-ray likely not able to did denote this discrete finding    ED treatment:  Ceftriaxone 1 g, Toradol 10 mg IV x1, morphine 4 mg IV x2, Zofran 4 mg, normal saline 250 cc x1    Patient Care Team:  Matthew Martel MD as PCP - General (Internal Medicine)  Salvatore Arellano MD as Consulting Physician (Endocrinology)  Alireza Parra MD as Consulting Physician (Cardiology)            Overview/Hospital Course:  Pain controlled and felt improved with CTX.      Interval History: NAEON    Review of Systems   Constitutional:  Negative for fatigue and fever.   Respiratory:  Negative for cough and shortness of breath.    Cardiovascular:  Negative for chest pain and leg swelling.   Genitourinary:  Positive for flank pain.   Musculoskeletal:  Positive for back pain. Negative for arthralgias.   Skin:  Negative for color change and pallor.   Objective:     Vital Signs (Most Recent):  Temp: 98.7 °F (37.1 °C) (04/15/22 1525)  Pulse: 83 (04/15/22 1525)  Resp: 18 (04/15/22 1525)  BP: (!) 173/97 (04/15/22 1525)  SpO2: (!) 90 % (04/15/22 1525)   Vital Signs (24h Range):  Temp:  [97.3 °F (36.3 °C)-98.7 °F (37.1 °C)] 98.7 °F (37.1 °C)  Pulse:  [83-98] 83  Resp:  [16-20] 18  SpO2:  [89 %-99 %] 90 %  BP: (133-178)/(65-97) 173/97     Weight: (!) 174 kg (383 lb 9.6 oz)  Body mass index is 67.95 kg/m².  No intake or output data in the 24 hours ending 04/15/22 1759   Physical Exam  Constitutional:       General: She is not in  acute distress.     Appearance: Normal appearance. She is not ill-appearing.   Cardiovascular:      Pulses: Normal pulses.      Heart sounds: Normal heart sounds.   Pulmonary:      Effort: Pulmonary effort is normal. No respiratory distress.      Breath sounds: Normal breath sounds.   Abdominal:      General: Bowel sounds are normal. There is no distension.      Palpations: Abdomen is soft.      Tenderness: There is right CVA tenderness.   Musculoskeletal:      Right lower leg: No edema.      Left lower leg: No edema.   Skin:     General: Skin is warm and dry.      Capillary Refill: Capillary refill takes less than 2 seconds.   Neurological:      General: No focal deficit present.      Mental Status: She is alert and oriented to person, place, and time.       Significant Labs: All pertinent labs within the past 24 hours have been reviewed.  CBC:   Recent Labs   Lab 04/14/22  1511 04/15/22  0410   WBC 10.85 10.67   HGB 10.9* 10.8*   HCT 34.0* 34.6*    269     CMP:   Recent Labs   Lab 04/14/22  1511 04/15/22  0410   * 138   K 3.3* 3.6   CL 97 97   CO2 29 30*   * 121*   BUN 10 9   CREATININE 0.6 0.7   CALCIUM 9.7 10.0   PROT 7.2  --    ALBUMIN 3.3* 3.1*   BILITOT 0.5  --    ALKPHOS 133  --    AST 18  --    ALT 10  --    ANIONGAP 9 11   EGFRNONAA >60.0 >60.0       Significant Imaging: I have reviewed all pertinent imaging results/findings within the past 24 hours.      Assessment/Plan:      * Acute cystitis with hematuria  46-year-old WF, history of Congestive Heart Failure status post pacer placement , obesity BMI (66). Hypothyroidism, Hyperparathyroidism, Gout, GERD presents with complaints of right lower quadrant abdominal pain found to have renal calculi and treated for UTI/pyelo with improvement    UTI, possible pyelo  -patient has had 3 visits to hospitals, admitted due to persistent pain, unable to see [facility] records - discuss obtaining reports/lab results with CM in the morning   -Based on the  "findings we have - patient has findings of nephrolithiasis and pyuria/hematuria but symptomology is on contralateral side, based on this, suspect patient may have a pyelonephritis.   Continue IV antibiotics and f/u Urine gram stain and Urine Culture.   -If patient has symptom improvement - plan for empiric pyelonpehritis treatment as an outpatient.   - If improved on 4/16 will likely dc on cipro once sensitivity and culture from OSH is confirmed      Morbid obesity with BMI of 60.0-69.9, adult  Body mass index is 67.95 kg/m². Morbid obesity complicates all aspects of disease management from diagnostic modalities to treatment. Weight loss encouraged and health benefits explained to patient.         Hypothyroidism  Continue armour thyroid  -check TSH/T4      Chronic congestive heart failure  -currently patient reports her weight is typically 375-380lbs  -admitted in February and weight was 415 lbs  -obtain standing weight when patient in medical vergara  -continue her home Torsemide dosing.   -Epic outside reconcile med list showed Irbesartan and Losartan, these are not in patient's med bag, also no beta blocker in use.  Defer adding other therapies to her primary cardiologist, given full EMR not available to us and pt has hx of pacer placement.       Left nephrolithiasis  As above      Hyperparathyroidism, primary  -may be primary risk factor for patient developing recurrent nephrolithiasis.   -she follows with an ambulatory endocrinologist - is on cinacalcet   >continue Cinacalcet  -reports having "four parathyroid tumors" that she needs removed, possible she is referring to normal parathyroidectomy being recommended for a diagnosis of primary hyperparathyroidism  -check PTH in AM         Right lower quadrant abdominal pain  -based on available data - my primary concern is pyelonephritis.   -ED with concerns pt with biliary colic,  Her pain symptoms seem unassociated with mealtimes, has been persistent since its onset " Monday, and other imaging studies may demonstrate evolution in findings.   -No abnormal cholestatic lab findings, no cholelithiasis on our CT scan - sludge noted.  Gen surgery evaluated patient and recommends HIDA scan as next diagnostic step if concern remains for possible biliary colic.      -Avoiding continuous IV fluids given her hx of CHF - consider intermittent bolus if needed based on hemodynamics.   -Placing PRN pain medication order at this time.       VTE Risk Mitigation (From admission, onward)         Ordered     enoxaparin injection 40 mg  Every 12 hours         04/14/22 2348     IP VTE HIGH RISK PATIENT  Once         04/14/22 2348     Place sequential compression device  Until discontinued         04/14/22 2348                Discharge Planning   GABI: 4/16/2022     Code Status: Full Code   Is the patient medically ready for discharge?: No    Reason for patient still in hospital (select all that apply): Patient trending condition and Treatment                     Alberto Medrano MD  Department of Hospital Medicine   [facility]

## 2022-04-15 NOTE — PLAN OF CARE
Patient alert and able to voice needs. No sing of distress. Respirations even and unlabored. Abdomen soft and nontender. Adjusting well to new environment

## 2022-04-15 NOTE — PT/OT/SLP EVAL
"Physical Therapy Evaluation    Patient Name:  Genesis Amezcua   MRN:  [id number]    Recommendations:     Discharge Recommendations:  outpatient PT   Discharge Equipment Recommendations: none   Barriers to discharge: None    Assessment:       Genesis Amezcua is a 46 y.o. female admitted with a medical diagnosis of Acute cystitis with hematuria.  She presents with the following impairments/functional limitations:  weakness, impaired endurance, gait instability, impaired balance, decreased lower extremity function.       Patient demonstrates decreased independence secondary to generalized weakness and abdominal pain.  Patient required between SBA and CGA for bed mobility, transfers and ambulation.  Patient ambulated ~ 120 ft with rolling walker and CGA for balance.  Patient will benefit from skilled PT for strengthening and mobility training in an acute care and outpatient PT setting    Rehab Prognosis: Good; patient would benefit from acute skilled PT services 3 x/week to address these deficits and reach maximum level of function.  Patient is most appropriate to go to outpatient PT .  Recent Surgery: * No surgery found *      Plan:     During this hospitalization, patient to be seen 3 x/week to address the identified rehab impairments via gait training, therapeutic activities, therapeutic exercises, neuromuscular re-education and progress toward the following goals:    · Plan of Care Expires:  05/15/22    Subjective     Subjective:"I feel a bit better than when I got here."  Pain/Comfort:  · Pain Rating 1:  (did not rate)  · Location - Orientation 1: generalized  · Location 1: abdomen  · Pain Addressed 1: Pre-medicate for activity, Reposition, Cessation of Activity  · Pain Rating Post-Intervention 1:  (did not rate)    Patients cultural, spiritual, Tenriism conflicts given the current situation: no    Living Environment:  Prior level of function: Patient lives with her 3 teenage children in a two story home with one steps to " enter with all the bedrooms on the second floor.  Patient intermittently walks with a rolling walker.  Patient reports fatigue with stairs and tries to limit times up/down stairs.    Support available upon discharge: family  Equipment owned: walker, rolling  Objective:     Communicated with nursing prior to session.  Patient found supine with telemetry  upon PT entry to room.    General Precautions: Standard, fall   Orthopedic Precautions:N/A   Braces: N/A     Exams:  · RLE ROM: ~ 75% of functional range  · RLE Strength: grossly 4/5  · LLE ROM: ~ 75% of functional range  · LLE Strength: grossly 4/5  · Cognitive Exam:  Patient is oriented to Person, Place, Time and Situation      Functional Mobility:  · Bed Mobility:     · Supine to Sit: stand by assistance  · Sit to Supine: stand by assistance  · Transfers:     · Sit to Stand: contact guard assistance with rolling walker for balance  · Gait: Patient ambulated 120 ft with rolling walker and contact guard assistance.  · Balance:   · Sitting: GOOD: Maintains balance through MODERATE excursions of active trunk movement  · Standing: FAIR: Needs CONTACT GUARD during gait      Therapeutic Activities and Exercises:   Gait training:  Patient required cues for stride length and upright posture to increase independence and safety.  Patient required cues ~ 25% of the time.    Patient Education:    Patient educated on assistive device use, bed mobility training, Fall risk, gait training, home safety, Home exercise program, plan of care and transfer training by explanation.  Patient was receptive to education and verbalizes understanding.     AM-PAC 6 CLICK MOBILITY  Total Score:20     Patient left supine with all lines intact and call button in reach.    GOALS:   Multidisciplinary Problems     Physical Therapy Goals        Problem: Physical Therapy    Goal Priority Disciplines Outcome Goal Variances Interventions   Physical Therapy Goal     PT, PT/OT Ongoing, Progressing      Description: Goals to be met by: 22    Patient will increase functional independence with mobility by performin. Supine to sit with Set-up [place]  2. Sit to supine with Set-up [place]  3. Sit to stand transfer with Stand-by Assistance  4. Gait  x 150 feet with Stand-by Assistance using Rolling Walker.   5.  Patient will demonstrate independence with a home exercise program  6. Patient's balance will be FAIR+: Needs CLOSE SUPERVISION during gait and is able to right self with minor LOB.  7. Ascend/descend 12 stair with one Handrails Stand-by Assistance using No Assistive Device.                   History:     Past Medical History:   Diagnosis Date    Chronic congestive heart failure 2022    Hyperparathyroidism, primary 2022    Reports hx of parathyroid tumor that need fede removed    Hypothyroidism 4/15/2022    Left nephrolithiasis 2022    Morbid obesity with BMI of 60.0-69.9, adult 4/15/2022       No past surgical history on file.    Time Tracking:     PT Received On: 04/15/22  PT Start Time: 1121     PT Stop Time: 1137  PT Total Time (min): 16 min     Billable Minutes: Evaluation 8 min Gait Training 8 min      Peterson Cheng, PT  04/15/2022

## 2022-04-15 NOTE — ASSESSMENT & PLAN NOTE
-patient has had 3 visits to hospitals, admitted due to persistent pain, unable to see [facility] records - discuss obtaining reports/lab results with CM in the morning   -Based on the findings we have - patient has findings of nephrolithiasis and pyuria/hematuria but symptomology is on contralateral side, based on this, suspect patient may have a pyelonephritis.   Continue IV antibiotics and f/u Urine gram stain and Urine Culture.   -If patient has symptom improvement - plan for empiric pyelonpehritis treatment as an outpatient.

## 2022-04-15 NOTE — ASSESSMENT & PLAN NOTE
-currently patient reports her weight is typically 375-380lbs  -admitted in February and weight was 415 lbs  -obtain standing weight when patient in medical vergara  -continue her home Torsemide dosing.   -Epic outside reconcile med list showed Irbesartan and Losartan, these are not in patient's med bag, also no beta blocker in use.  Defer adding other therapies to her primary cardiologist, given full EMR not available to us and pt has hx of pacer placement.

## 2022-04-15 NOTE — ASSESSMENT & PLAN NOTE
Body mass index is 67.95 kg/m². Morbid obesity complicates all aspects of disease management from diagnostic modalities to treatment. Weight loss encouraged and health benefits explained to patient.

## 2022-04-15 NOTE — PHARMACY MED REC
"  Admission Medication History     The home medication history was taken by Yani Galan.    You may go to "Admission" then "Reconcile Home Medications" tabs to review and/or act upon these items.      The home medication list has been updated by the Pharmacy department.    Please read ALL comments highlighted in yellow.    Please address this information as you see fit.     Feel free to contact us if you have any questions or require assistance.      Medications listed below were obtained from: Patient/family  PTA Medications   Medication Sig    allopurinoL (ZYLOPRIM) 300 MG tablet Take 300 mg by mouth once daily.    ARMOUR THYROID 180 mg Tab Take 1 tablet by mouth once daily.    cefUROXime (CEFTIN) 500 MG tablet Take 500 mg by mouth 2 (two) times daily.    cinacalcet (SENSIPAR) 30 MG Tab Take 30 mg by mouth 2 (two) times daily.    EScitalopram oxalate (LEXAPRO) 10 MG tablet Take 10 mg by mouth once daily.    HYDROcodone-acetaminophen (NORCO) 5-325 mg per tablet Take 1 tablet by mouth every 6 (six) hours as needed.    ibuprofen (ADVIL,MOTRIN) 200 MG tablet Take 400 mg by mouth every 8 (eight) hours as needed (chronic back pain - scoliosis).    omeprazole (PRILOSEC) 40 MG capsule Take 40 mg by mouth once daily.    phentermine (ADIPEX-P) 37.5 mg tablet Take 37.5 mg by mouth once daily.    potassium chloride (K-TAB) 20 mEq Take 20 mEq by mouth once daily.    torsemide (DEMADEX) 20 MG Tab Take 20 mg by mouth 2 (two) times a day.    colchicine (MITIGARE) 0.6 mg Cap Take 1 capsule by mouth 2 (two) times daily.    ketorolac (TORADOL) 10 mg tablet Take 10 mg by mouth every 6 (six) hours as needed.         Yani Galan  [id number]                .          "

## 2022-04-15 NOTE — H&P
[facility] - Emergency Dept  [facility] Medicine  History & Physical    Patient Name: Genesis Amezcua  MRN: [id number]  Patient Class: OP- Observation  Admission Date: 4/14/2022  Attending Physician: Alberto Medrano MD  [facility] [place] [facility]   Admitting Physician: Alfonso Arevalo MD      Primary Care Provider: Matthew Martel MD         Patient information was obtained from patient and ER records.     Subjective:     Principal Problem:Acute cystitis with hematuria    Chief Complaint:   Chief Complaint   Patient presents with    Abdominal Pain        HPI: 46-year-old WF, history of Congestive Heart Failure status post pacer placement , obesity BMI (66). Hypothyroidism, Hyperparathyroidism, Gout, GERD presents with complaints of right lower quadrant abdominal pain.     She is followed for her care within the [facility].  She reports on April 12th, got out of bed and felt overall pain that she initially thought was hunger pains verses a muscular pain in the abdomen, the pain progressively got worsened Tuesday night at 10:00 p.m. she presented to [facility] Emergency Department, states that she had multiple tests done including lab studies and imaging was told that she had stones in her gallbladder and ultimately was discharged at 4:00 a.m. Wednesday morning.  By 10:00 a.m. her symptoms had gotten worsened pain was rated 10/10 so she return to the emergency department, reporting that she underwent further testing, kidney stones noted in left kidney, also has been told she has urinary tract her bladder infection and was prescribed oral antibiotic, oral Toradol, Norco and again discharge.  A she was advised to see a urologist to she saw earlier on Thursday who reported that he did not have any other management or treatment to offer patient at this time, and he did not believe patient has right-sided pain may be due to left-sided kidney stones.    [facility] hoping for further assistance in  this workup.  She has undergone CT abdomen pelvis that demonstrates sludge in the gallbladder without discrete stones no biliary ductal abnormalities, and left-sided nonobstructive kidney stones.  General surgery had evaluated patient recommends no operative intervention at this time based on findings.  Patient has no abnormal cholestatic markers on lab workup and a normal lipase.    Incidental finding on CT chest patient has a left lower lobe nodular finding read as possible infection verse malignancy or in inflammatory component, patient in notified of results states she has not been told of any abnormal chest findings on any prior CT scan of the chest but unclear when last examination management, as chest x-ray likely not able to did denote this discrete finding    ED treatment:  Ceftriaxone 1 g, Toradol 10 mg IV x1, morphine 4 mg IV x2, Zofran 4 mg, normal saline 250 cc x1    Patient Care Team:  Matthew Martel MD as PCP - General (Internal Medicine)  Salvatore Arellano MD as Consulting Physician (Endocrinology)  Alireza Parra MD as Consulting Physician (Cardiology)            Past Medical History:   Diagnosis Date    Chronic congestive heart failure 4/14/2022    Hyperparathyroidism, primary 4/14/2022    Reports hx of parathyroid tumor that need fede removed    Hypothyroidism 4/15/2022    Left nephrolithiasis 4/14/2022       No past surgical history on file.    Review of patient's allergies indicates:   Allergen Reactions    Penicillins Hives       No current facility-administered medications on file prior to encounter.     Current Outpatient Medications on File Prior to Encounter   Medication Sig    allopurinoL (ZYLOPRIM) 300 MG tablet Take 300 mg by mouth once daily.    ARMOUR THYROID 180 mg Tab Take 1 tablet by mouth once daily.    cefUROXime (CEFTIN) 500 MG tablet Take 500 mg by mouth 2 (two) times daily.    cinacalcet (SENSIPAR) 30 MG Tab Take 30 mg by mouth 2 (two) times daily.     colchicine (MITIGARE) 0.6 mg Cap Take 1 capsule by mouth 2 (two) times daily.    EScitalopram oxalate (LEXAPRO) 10 MG tablet Take 10 mg by mouth once daily.    HYDROcodone-acetaminophen (NORCO) 5-325 mg per tablet Take 1 tablet by mouth every 6 (six) hours as needed.    ibuprofen (ADVIL,MOTRIN) 200 MG tablet Take 400 mg by mouth every 8 (eight) hours as needed (chronic back pain - scoliosis).    omeprazole (PRILOSEC) 40 MG capsule Take 40 mg by mouth once daily.    phentermine (ADIPEX-P) 37.5 mg tablet Take 37.5 mg by mouth once daily.    potassium chloride (K-TAB) 20 mEq Take 20 mEq by mouth once daily.    torsemide (DEMADEX) 20 MG Tab Take 20 mg by mouth 2 (two) times a day.    ketorolac (TORADOL) 10 mg tablet Take 10 mg by mouth every 6 (six) hours as needed.     Family History    None       Tobacco Use    Smoking status: Not on file    Smokeless tobacco: Not on file   Substance and Sexual Activity    Alcohol use: Not on file    Drug use: Not on file    Sexual activity: Not on file     Review of Systems   Constitutional:  Positive for appetite change. Negative for activity change, fatigue and fever.   HENT:  Negative for trouble swallowing.    Respiratory:  Negative for cough, shortness of breath and stridor.    Gastrointestinal:  Positive for nausea. Negative for constipation, diarrhea and vomiting.   Genitourinary:  Negative for dysuria.   Musculoskeletal:  Positive for back pain.   Skin:  Negative for rash.   Neurological:  Positive for weakness.   Psychiatric/Behavioral:  Negative for confusion.    Objective:     Vital Signs (Most Recent):  Temp: 98.7 °F (37.1 °C) (04/15/22 0049)  Pulse: 86 (04/15/22 0049)  Resp: 16 (04/15/22 0057)  BP: (!) 147/88 (04/15/22 0049)  SpO2: 99 % (04/15/22 0049)   Vital Signs (24h Range):  Temp:  [98.1 °F (36.7 °C)-98.7 °F (37.1 °C)] 98.7 °F (37.1 °C)  Pulse:  [] 86  Resp:  [16-20] 16  SpO2:  [97 %-99 %] 99 %  BP: (140-178)/(70-91) 147/88     Weight: (!) 174 kg  (383 lb 9.6 oz)  Body mass index is 67.95 kg/m².    Physical Exam  Constitutional:       Appearance: She is obese. She is ill-appearing.   HENT:      Head: Normocephalic and atraumatic.   Eyes:      General: No scleral icterus.  Cardiovascular:      Rate and Rhythm: Normal rate and regular rhythm.      Heart sounds: Murmur heard.   Pulmonary:      Effort: Pulmonary effort is normal. No respiratory distress.      Breath sounds: No wheezing or rales.   Abdominal:      General: There is no distension.      Palpations: Abdomen is soft.      Tenderness: There is abdominal tenderness. There is no rebound.      Comments: Tender to palpation in the right flank extending anteriorly to the right lower quadrant.  Tenderness into right posterior chest wall   Musculoskeletal:      Right lower leg: Edema present.      Left lower leg: Edema present.   Skin:     General: Skin is warm and dry.   Neurological:      General: No focal deficit present.      Mental Status: She is alert and oriented to person, place, and time.           Significant Labs: All pertinent labs within the past 24 hours have been reviewed.    Significant Imaging: I have reviewed all pertinent imaging results/findings within the past 24 hours.    Assessment/Plan:     * Acute cystitis with hematuria  -patient has had 3 visits to hospitals, admitted due to persistent pain, unable to see [facility] records - discuss obtaining reports/lab results with CM in the morning   -Based on the findings we have - patient has findings of nephrolithiasis and pyuria/hematuria but symptomology is on contralateral side, based on this, suspect patient may have a pyelonephritis.   Continue IV antibiotics and f/u Urine gram stain and Urine Culture.   -If patient has symptom improvement - plan for empiric pyelonpehritis treatment as an outpatient.       Right lower quadrant abdominal pain  -based on available data - my primary concern is pyelonephritis.   -ED with concerns pt with biliary  "colic,  Her pain symptoms seem unassociated with mealtimes, has been persistent since its onset Monday, and other imaging studies may demonstrate evolution in findings.   -No abnormal cholestatic lab findings, no cholelithiasis on our CT scan - sludge noted.  Gen surgery evaluated patient and recommends HIDA scan as next diagnostic step if concern remains for possible biliary colic.      -Avoiding continuous IV fluids given her hx of CHF - consider intermittent bolus if needed based on hemodynamics.   -Placing PRN pain medication order at this time.     Left nephrolithiasis  As above      Hyperparathyroidism, primary  -may be primary risk factor for patient developing recurrent nephrolithiasis.   -she follows with an ambulatory endocrinologist - is on cinacalcet   >continue Cinacalcet  -reports having "four parathyroid tumors" that she needs removed, possible she is referring to normal parathyroidectomy being recommended for a diagnosis of primary hyperparathyroidism  -check PTH in AM         Chronic congestive heart failure  -currently patient reports her weight is typically 375-380lbs  -admitted in February and weight was 415 lbs  -obtain standing weight when patient in medical vergara  -continue her home Torsemide dosing.   -Epic outside reconcile med list showed Irbesartan and Losartan, these are not in patient's med bag, also no beta blocker in use.  Defer adding other therapies to her primary cardiologist, given full EMR not available to us and pt has hx of pacer placement.       Hypothyroidism  Continue [place] thyroid  -check TSH/T4      Morbid obesity with BMI of 60.0-69.9, adult  Body mass index is 67.95 kg/m². Morbid obesity complicates all aspects of disease management from diagnostic modalities to treatment. Weight loss encouraged and health benefits explained to patient.           VTE Risk Mitigation (From admission, onward)         Ordered     enoxaparin injection 40 mg  Every 12 hours         04/14/22 " 2348     IP VTE HIGH RISK PATIENT  Once         04/14/22 2348     Place sequential compression device  Until discontinued         04/14/22 2348                   Alfonso Arevalo MD  Department of Hospital Medicine   [facility] - Emergency Dept

## 2022-04-15 NOTE — ASSESSMENT & PLAN NOTE
-based on available data - my primary concern is pyelonephritis.   -ED with concerns pt with biliary colic,  Her pain symptoms seem unassociated with mealtimes, has been persistent since its onset Monday, and other imaging studies may demonstrate evolution in findings.   -No abnormal cholestatic lab findings, no cholelithiasis on our CT scan - sludge noted.  Gen surgery evaluated patient and recommends HIDA scan as next diagnostic step if concern remains for possible biliary colic.      -Avoiding continuous IV fluids given her hx of CHF - consider intermittent bolus if needed based on hemodynamics.   -Placing PRN pain medication order at this time.

## 2022-04-15 NOTE — SUBJECTIVE & OBJECTIVE
Interval History: NAEON    Review of Systems   Constitutional:  Negative for fatigue and fever.   Respiratory:  Negative for cough and shortness of breath.    Cardiovascular:  Negative for chest pain and leg swelling.   Genitourinary:  Positive for flank pain.   Musculoskeletal:  Positive for back pain. Negative for arthralgias.   Skin:  Negative for color change and pallor.   Objective:     Vital Signs (Most Recent):  Temp: 98.7 °F (37.1 °C) (04/15/22 1525)  Pulse: 83 (04/15/22 1525)  Resp: 18 (04/15/22 1525)  BP: (!) 173/97 (04/15/22 1525)  SpO2: (!) 90 % (04/15/22 1525)   Vital Signs (24h Range):  Temp:  [97.3 °F (36.3 °C)-98.7 °F (37.1 °C)] 98.7 °F (37.1 °C)  Pulse:  [83-98] 83  Resp:  [16-20] 18  SpO2:  [89 %-99 %] 90 %  BP: (133-178)/(65-97) 173/97     Weight: (!) 174 kg (383 lb 9.6 oz)  Body mass index is 67.95 kg/m².  No intake or output data in the 24 hours ending 04/15/22 1759   Physical Exam  Constitutional:       General: She is not in acute distress.     Appearance: Normal appearance. She is not ill-appearing.   Cardiovascular:      Pulses: Normal pulses.      Heart sounds: Normal heart sounds.   Pulmonary:      Effort: Pulmonary effort is normal. No respiratory distress.      Breath sounds: Normal breath sounds.   Abdominal:      General: Bowel sounds are normal. There is no distension.      Palpations: Abdomen is soft.      Tenderness: There is right CVA tenderness.   Musculoskeletal:      Right lower leg: No edema.      Left lower leg: No edema.   Skin:     General: Skin is warm and dry.      Capillary Refill: Capillary refill takes less than 2 seconds.   Neurological:      General: No focal deficit present.      Mental Status: She is alert and oriented to person, place, and time.       Significant Labs: All pertinent labs within the past 24 hours have been reviewed.  CBC:   Recent Labs   Lab 04/14/22  1511 04/15/22  0410   WBC 10.85 10.67   HGB 10.9* 10.8*   HCT 34.0* 34.6*    269     CMP:    Recent Labs   Lab 04/14/22  1511 04/15/22  0410   * 138   K 3.3* 3.6   CL 97 97   CO2 29 30*   * 121*   BUN 10 9   CREATININE 0.6 0.7   CALCIUM 9.7 10.0   PROT 7.2  --    ALBUMIN 3.3* 3.1*   BILITOT 0.5  --    ALKPHOS 133  --    AST 18  --    ALT 10  --    ANIONGAP 9 11   EGFRNONAA >60.0 >60.0       Significant Imaging: I have reviewed all pertinent imaging results/findings within the past 24 hours.

## 2022-04-15 NOTE — SUBJECTIVE & OBJECTIVE
Past Medical History:   Diagnosis Date    Chronic congestive heart failure 4/14/2022    Hyperparathyroidism, primary 4/14/2022    Reports hx of parathyroid tumor that need fede removed    Hypothyroidism 4/15/2022    Left nephrolithiasis 4/14/2022       No past surgical history on file.    Review of patient's allergies indicates:   Allergen Reactions    Penicillins Hives       No current facility-administered medications on file prior to encounter.     Current Outpatient Medications on File Prior to Encounter   Medication Sig    allopurinoL (ZYLOPRIM) 300 MG tablet Take 300 mg by mouth once daily.    ARMOUR THYROID 180 mg Tab Take 1 tablet by mouth once daily.    cefUROXime (CEFTIN) 500 MG tablet Take 500 mg by mouth 2 (two) times daily.    cinacalcet (SENSIPAR) 30 MG Tab Take 30 mg by mouth 2 (two) times daily.    colchicine (MITIGARE) 0.6 mg Cap Take 1 capsule by mouth 2 (two) times daily.    EScitalopram oxalate (LEXAPRO) 10 MG tablet Take 10 mg by mouth once daily.    HYDROcodone-acetaminophen (NORCO) 5-325 mg per tablet Take 1 tablet by mouth every 6 (six) hours as needed.    ibuprofen (ADVIL,MOTRIN) 200 MG tablet Take 400 mg by mouth every 8 (eight) hours as needed (chronic back pain - scoliosis).    omeprazole (PRILOSEC) 40 MG capsule Take 40 mg by mouth once daily.    phentermine (ADIPEX-P) 37.5 mg tablet Take 37.5 mg by mouth once daily.    potassium chloride (K-TAB) 20 mEq Take 20 mEq by mouth once daily.    torsemide (DEMADEX) 20 MG Tab Take 20 mg by mouth 2 (two) times a day.    ketorolac (TORADOL) 10 mg tablet Take 10 mg by mouth every 6 (six) hours as needed.     Family History    None       Tobacco Use    Smoking status: Not on file    Smokeless tobacco: Not on file   Substance and Sexual Activity    Alcohol use: Not on file    Drug use: Not on file    Sexual activity: Not on file     Review of Systems   Constitutional:  Positive for appetite change. Negative for activity change, fatigue and fever.    HENT:  Negative for trouble swallowing.    Respiratory:  Negative for cough, shortness of breath and stridor.    Gastrointestinal:  Positive for nausea. Negative for constipation, diarrhea and vomiting.   Genitourinary:  Negative for dysuria.   Musculoskeletal:  Positive for back pain.   Skin:  Negative for rash.   Neurological:  Positive for weakness.   Psychiatric/Behavioral:  Negative for confusion.    Objective:     Vital Signs (Most Recent):  Temp: 98.7 °F (37.1 °C) (04/15/22 0049)  Pulse: 86 (04/15/22 0049)  Resp: 16 (04/15/22 0057)  BP: (!) 147/88 (04/15/22 0049)  SpO2: 99 % (04/15/22 0049)   Vital Signs (24h Range):  Temp:  [98.1 °F (36.7 °C)-98.7 °F (37.1 °C)] 98.7 °F (37.1 °C)  Pulse:  [] 86  Resp:  [16-20] 16  SpO2:  [97 %-99 %] 99 %  BP: (140-178)/(70-91) 147/88     Weight: (!) 174 kg (383 lb 9.6 oz)  Body mass index is 67.95 kg/m².    Physical Exam  Constitutional:       Appearance: She is obese. She is ill-appearing.   HENT:      Head: Normocephalic and atraumatic.   Eyes:      General: No scleral icterus.  Cardiovascular:      Rate and Rhythm: Normal rate and regular rhythm.      Heart sounds: Murmur heard.   Pulmonary:      Effort: Pulmonary effort is normal. No respiratory distress.      Breath sounds: No wheezing or rales.   Abdominal:      General: There is no distension.      Palpations: Abdomen is soft.      Tenderness: There is abdominal tenderness. There is no rebound.      Comments: Tender to palpation in the right flank extending anteriorly to the right lower quadrant.  Tenderness into right posterior chest wall   Musculoskeletal:      Right lower leg: Edema present.      Left lower leg: Edema present.   Skin:     General: Skin is warm and dry.   Neurological:      General: No focal deficit present.      Mental Status: She is alert and oriented to person, place, and time.           Significant Labs: All pertinent labs within the past 24 hours have been reviewed.    Significant  Imaging: I have reviewed all pertinent imaging results/findings within the past 24 hours.

## 2022-04-15 NOTE — PT/OT/SLP EVAL
Occupational Therapy   Evaluation/Treament    Name: Genesis Amezcua  MRN: [id number]  Admitting Diagnosis:  Acute cystitis with hematuria  Recent Surgery: * No surgery found *      Recommendations:     Discharge Recommendations: home  Discharge Equipment Recommendations:  none  Barriers to discharge:  None    Assessment:     Genesis Amezcua is a 46 y.o. female with a medical diagnosis of Acute cystitis with hematuria. Performance deficits affecting function: weakness, impaired endurance, impaired self care skills, impaired functional mobilty, gait instability, impaired balance, decreased lower extremity function, pain. Patient would benefit from continued skilled acute OT 3x/wk to improve functional mobility, increase independence with ADLs, and address established goal prior to discharging home.     Rehab Prognosis: Good; patient would benefit from acute skilled OT services to address these deficits and reach maximum level of function.       Plan:     Patient to be seen 3 x/week to address the above listed problems via self-care/home management, therapeutic exercises, therapeutic activities  · Plan of Care Expires: 05/15/22  · Plan of Care Reviewed with: patient    Subjective     Chief Complaint: pain in B LEs more on Left  Patient/Family Comments/goals:  Patient agreed to therapy    Occupational Profile:  Living Environment: Patient lives with her 3 teenage children in a two story home with one steps to enter with all the bedrooms on the second floor (17 KAIT and L HR). Patient has a regular toilet and no grab bars. Patient has a tub shower combo with no bench or grab bars. Patient was independent with ADLs. Patient intermittently walks with a rolling walker.  Patient reports fatigue with stairs and tries to limit times up/down stairs.     Pain/Comfort:  · Pain Rating 1:  (5/10 on R and 10/10 on L)  · Location - Side 1: Bilateral  · Location - Orientation 1: generalized  · Location 1: foot  · Pain Addressed 1: Reposition,  Distraction  · Pain Rating Post-Intervention 1:  (same as above)    Patients cultural, spiritual, Yazidi conflicts given the current situation: no    Objective:     Communicated with: NSG prior to session.  Patient found HOB elevated with telemetry upon OT entry to room.    General Precautions: Standard, fall   Orthopedic Precautions:N/A   Braces: N/A  Respiratory Status: Room air    Occupational Performance:    Bed Mobility:    · Patient completed Supine to Sit with stand by assistance  · Patient completed Sit to Supine with stand by assistance    Functional Mobility/Transfers:  · Patient completed Sit <> Stand Transfer with contact guard assistance <> SBA  with  rolling walker   · Patient completed Toilet Transfer bed<>toilet with CGA<>SBA technique with rolling walker    Activities of Daily Living:  · Grooming: stand by assistance standing at sink for washing/drying hands only  · Lower Body Dressing: modified independence donning and doffing socks sitting EOB  · Toileting: supervision    Cognitive/Visual Perceptual:  Cognitive/Psychosocial Skills:     -       Oriented to: Person, Place, Time and Situation   -       Follows Commands/attention:Follows multistep  commands  -       Communication: clear/fluent  -       Memory: No Deficits noted  -       Safety awareness/insight to disability: intact   -       Mood/Affect/Coping skills/emotional control: Appropriate to situation  Visual/Perceptual:      -Intact      Physical Exam:  Upper Extremity Range of Motion:     -       Right Upper Extremity: WFL  -       Left Upper Extremity: WFL  Upper Extremity Strength:    -       Right Upper Extremity: WFL  -       Left Upper Extremity: WFL   Strength:    -       Right Upper Extremity: WFL  -       Left Upper Extremity: WFL  Fine Motor Coordination:    -       Intact  Gross motor coordination:   WFL    AMPAC 6 Click ADL:  AMPAC Total Score: 21    Treatment & Education:  Role of OT and POC  ADL retraining  Functional  mobility training  Safety  Discharge planning    Education:  Patient left HOB elevated with call button in reach and all needs met.     GOALS:   Multidisciplinary Problems     Occupational Therapy Goals        Problem: Occupational Therapy    Goal Priority Disciplines Outcome Interventions   Occupational Therapy Goal     OT, PT/OT Ongoing, Progressing    Description: Goals to be met by: 5/5/2022    Patient will increase functional independence with ADLs by performing:    Grooming while standing at sink with Modified [place].  Toileting from toilet with Modified [place] for hygiene and clothing management.   Toilet transfer to toilet with Modified [place].                     History:     Past Medical History:   Diagnosis Date    Chronic congestive heart failure 4/14/2022    Hyperparathyroidism, primary 4/14/2022    Reports hx of parathyroid tumor that need fede removed    Hypothyroidism 4/15/2022    Left nephrolithiasis 4/14/2022    Morbid obesity with BMI of 60.0-69.9, adult 4/15/2022       No past surgical history on file.    Time Tracking:     OT Date of Treatment: 04/15/22  OT Start Time: 1410  OT Stop Time: 1435  OT Total Time (min): 25 min    Billable Minutes:Evaluation 10  Self Care/Home Management 15    4/15/2022

## 2022-04-15 NOTE — HPI
46-year-old WF, history of Congestive Heart Failure status post pacer placement , obesity BMI (66). Hypothyroidism, Hyperparathyroidism, Gout, GERD presents with complaints of right lower quadrant abdominal pain.     She is followed for her care within the [facility].  She reports on April 12th, got out of bed and felt overall pain that she initially thought was hunger pains verses a muscular pain in the abdomen, the pain progressively got worsened Tuesday night at 10:00 p.m. she presented to [facility] Emergency [facility], states that she had multiple tests done including lab studies and imaging was told that she had stones in her gallbladder and ultimately was discharged at 4:00 a.m. Wednesday morning.  By 10:00 a.m. her symptoms had gotten worsened pain was rated 10/10 so she return to the emergency department, reporting that she underwent further testing, kidney stones noted in left kidney, also has been told she has urinary tract her bladder infection and was prescribed oral antibiotic, oral Toradol, Norco and again discharge.  A she was advised to see a urologist to she saw earlier on Thursday who reported that he did not have any other management or treatment to offer patient at this time, and he did not believe patient has right-sided pain may be due to left-sided kidney stones.    She presented to [facility] are today hoping for further assistance in this workup.  She has undergone CT abdomen pelvis that demonstrates sludge in the gallbladder without discrete stones no biliary ductal abnormalities, and left-sided nonobstructive kidney stones.  General surgery had evaluated patient recommends no operative intervention at this time based on findings.  Patient has no abnormal cholestatic markers on lab workup and a normal lipase.    Incidental finding on CT chest patient has a left lower lobe nodular finding read as possible infection verse malignancy or in inflammatory component, patient  in notified of results states she has not been told of any abnormal chest findings on any prior CT scan of the chest but unclear when last examination management, as chest x-ray likely not able to did denote this discrete finding    ED treatment:  Ceftriaxone 1 g, Toradol 10 mg IV x1, morphine 4 mg IV x2, Zofran 4 mg, normal saline 250 cc x1    Patient Care Team:  Matthew Martel MD as PCP - General (Internal Medicine)  Salvatore Arellano MD as Consulting Physician (Endocrinology)  Alireza Parra MD as Consulting Physician (Cardiology)

## 2022-04-15 NOTE — ASSESSMENT & PLAN NOTE
"-may be primary risk factor for patient developing recurrent nephrolithiasis.   -she follows with an ambulatory endocrinologist - is on cinacalcet   >continue Cinacalcet  -reports having "four parathyroid tumors" that she needs removed, possible she is referring to normal parathyroidectomy being recommended for a diagnosis of primary hyperparathyroidism  -check PTH in AM       "

## 2022-04-15 NOTE — ED NOTES
ED Physician Hand-off Note:    ED Course: I assumed care of patient from off-going ED physician team. Briefly, Patient is a 46-year-old female with history of CHF, pacemaker, GERD, gout, morbid obesity presents for right-sided abdominal pain.    At the time of signout plan was pending CT abdomen and pelvis.    Medications given in the ED:    Medications   morphine injection 4 mg (4 mg Intravenous Not Given 4/14/22 1700)   ondansetron injection 4 mg (4 mg Intravenous Given 4/14/22 1550)   ketorolac injection 9.999 mg (9.999 mg Intravenous Given 4/14/22 1551)   sodium chloride 0.9% bolus 250 mL (0 mLs Intravenous Stopped 4/14/22 1835)   morphine injection 4 mg (4 mg Intravenous Given 4/14/22 1548)   iohexoL (OMNIPAQUE 350) injection 100 mL (100 mLs Intravenous Given 4/14/22 1602)   cefTRIAXone (ROCEPHIN) 1 g/50 mL D5W IVPB (0 g Intravenous Stopped 4/14/22 1835)       Disposition:  CT abdomen and pelvis was reviewed shows biliary sludge/stones with no cholecystitis.  This was discussed with General surgery states that patient is not a good surgical candidate with no cholecystitis and her morbid obesity and recommends admission to hospital medicine for HIDA scan.  Incidentally noted to have a hilar mass on chest x-ray so CT chest was obtained which shows a mass that is concerning for infection versus malignancy.  Patient also noted to have UTI on her UA as well as a nonobstructing left kidney stone and was given Rocephin in the ED.  this is the 3rd ED visit in 3 days and patient pain is not relieved with [place] and Toradol at home and was given pain medications x3 in the ED will admit for intractable pain of her biliary colic as well as further evaluation of her lung mass.    Patient comfortable with admission. Patient counseled regarding exam, results, diagnosis, treatment, and plan.    Impression:  Biliary colic, lung mass, UTI       Deirdre Epps PA-C  04/14/22 1948

## 2022-04-15 NOTE — PLAN OF CARE
Problem: Occupational Therapy  Goal: Occupational Therapy Goal  Description: Goals to be met by: 5/5/2022    Patient will increase functional independence with ADLs by performing:    Grooming while standing at sink with Modified [place].  Toileting from toilet with Modified [place] for hygiene and clothing management.   Toilet transfer to toilet with Modified [place].    Outcome: Ongoing, Progressing   Patient's goals are set.

## 2022-04-15 NOTE — ASSESSMENT & PLAN NOTE
46-year-old WF, history of Congestive Heart Failure status post pacer placement , obesity BMI (66). Hypothyroidism, Hyperparathyroidism, Gout, GERD presents with complaints of right lower quadrant abdominal pain found to have renal calculi and treated for UTI/pyelo with improvement    UTI, possible pyelo  -patient has had 3 visits to hospitals, admitted due to persistent pain, unable to see [facility] records - discuss obtaining reports/lab results with CM in the morning   -Based on the findings we have - patient has findings of nephrolithiasis and pyuria/hematuria but symptomology is on contralateral side, based on this, suspect patient may have a pyelonephritis.   Continue IV antibiotics and f/u Urine gram stain and Urine Culture.   -If patient has symptom improvement - plan for empiric pyelonpehritis treatment as an outpatient.   - If improved on 4/16 will likely dc on cipro once sensitivity and culture from OSH is confirmed

## 2022-04-15 NOTE — PLAN OF CARE
Problem: Physical Therapy  Goal: Physical Therapy Goal  Description: Goals to be met by: 22    Patient will increase functional independence with mobility by performin. Supine to sit with Set-up [place]  2. Sit to supine with Set-up [place]  3. Sit to stand transfer with Stand-by Assistance  4. Gait  x 150 feet with Stand-by Assistance using Rolling Walker.   5.  Patient will demonstrate independence with a home exercise program  6. Patient's balance will be FAIR+: Needs CLOSE SUPERVISION during gait and is able to right self with minor LOB.  7. Ascend/descend 12 stair with one Handrails Stand-by Assistance using No Assistive Device.  Outcome: Ongoing, Progressing     PT evaluation completed, goals established and plan of care reviewed with patient.  Patient demonstrates decreased independence secondary to generalized weakness and abdominal pain.  Patient required between SBA and CGA for bed mobility, transfers and ambulation.  Patient ambulated ~ 120 ft with rolling walker and CGA for balance.  Patient will benefit from skilled PT for strengthening and mobility training in an acute care and outpatient PT setting.      Peterson Cheng, PT, DPT  4/15/2022

## 2022-04-16 VITALS
RESPIRATION RATE: 16 BRPM | OXYGEN SATURATION: 92 % | HEIGHT: 63 IN | DIASTOLIC BLOOD PRESSURE: 88 MMHG | TEMPERATURE: 97 F | BODY MASS INDEX: 51.91 KG/M2 | WEIGHT: 293 LBS | SYSTOLIC BLOOD PRESSURE: 168 MMHG | HEART RATE: 86 BPM

## 2022-04-16 PROCEDURE — 93010 ELECTROCARDIOGRAM REPORT: CPT | Mod: ,,, | Performed by: INTERNAL MEDICINE

## 2022-04-16 PROCEDURE — 96376 TX/PRO/DX INJ SAME DRUG ADON: CPT

## 2022-04-16 PROCEDURE — 93005 ELECTROCARDIOGRAM TRACING: CPT

## 2022-04-16 PROCEDURE — 99900035 HC TECH TIME PER 15 MIN (STAT)

## 2022-04-16 PROCEDURE — 94761 N-INVAS EAR/PLS OXIMETRY MLT: CPT

## 2022-04-16 PROCEDURE — 99225 PR SUBSEQUENT OBSERVATION CARE,LEVEL II: CPT | Mod: ,,, | Performed by: STUDENT IN AN ORGANIZED HEALTH CARE EDUCATION/TRAINING PROGRAM

## 2022-04-16 PROCEDURE — 96372 THER/PROPH/DIAG INJ SC/IM: CPT | Performed by: HOSPITALIST

## 2022-04-16 PROCEDURE — 25000003 PHARM REV CODE 250: Performed by: HOSPITALIST

## 2022-04-16 PROCEDURE — 99225 PR SUBSEQUENT OBSERVATION CARE,LEVEL II: ICD-10-PCS | Mod: ,,, | Performed by: STUDENT IN AN ORGANIZED HEALTH CARE EDUCATION/TRAINING PROGRAM

## 2022-04-16 PROCEDURE — 93010 EKG 12-LEAD: ICD-10-PCS | Mod: ,,, | Performed by: INTERNAL MEDICINE

## 2022-04-16 PROCEDURE — G0378 HOSPITAL OBSERVATION PER HR: HCPCS

## 2022-04-16 PROCEDURE — 25000003 PHARM REV CODE 250: Performed by: STUDENT IN AN ORGANIZED HEALTH CARE EDUCATION/TRAINING PROGRAM

## 2022-04-16 PROCEDURE — 63600175 PHARM REV CODE 636 W HCPCS: Performed by: HOSPITALIST

## 2022-04-16 RX ORDER — CYCLOBENZAPRINE HCL 5 MG
5 TABLET ORAL 3 TIMES DAILY PRN
Status: DISCONTINUED | OUTPATIENT
Start: 2022-04-16 | End: 2022-04-16

## 2022-04-16 RX ORDER — OXYCODONE HYDROCHLORIDE 10 MG/1
10 TABLET ORAL EVERY 6 HOURS PRN
Qty: 15 TABLET | Refills: 0 | Status: SHIPPED | OUTPATIENT
Start: 2022-04-16

## 2022-04-16 RX ORDER — METHOCARBAMOL 500 MG/1
500 TABLET, FILM COATED ORAL 4 TIMES DAILY PRN
Status: DISCONTINUED | OUTPATIENT
Start: 2022-04-16 | End: 2022-04-16

## 2022-04-16 RX ORDER — LIDOCAINE 50 MG/G
1 PATCH TOPICAL
Status: DISCONTINUED | OUTPATIENT
Start: 2022-04-16 | End: 2022-04-16

## 2022-04-16 RX ORDER — OXYCODONE HYDROCHLORIDE 10 MG/1
10 TABLET ORAL EVERY 6 HOURS PRN
Status: DISCONTINUED | OUTPATIENT
Start: 2022-04-16 | End: 2022-04-16 | Stop reason: HOSPADM

## 2022-04-16 RX ORDER — MORPHINE SULFATE 4 MG/ML
4 INJECTION, SOLUTION INTRAMUSCULAR; INTRAVENOUS EVERY 4 HOURS PRN
Status: DISCONTINUED | OUTPATIENT
Start: 2022-04-16 | End: 2022-04-16 | Stop reason: HOSPADM

## 2022-04-16 RX ORDER — ACETAMINOPHEN 325 MG/1
650 TABLET ORAL EVERY 6 HOURS
Status: DISCONTINUED | OUTPATIENT
Start: 2022-04-16 | End: 2022-04-16 | Stop reason: HOSPADM

## 2022-04-16 RX ORDER — POLYETHYLENE GLYCOL 3350 17 G/17G
17 POWDER, FOR SOLUTION ORAL DAILY
Qty: 238 G | Refills: 0 | Status: SHIPPED | OUTPATIENT
Start: 2022-04-17

## 2022-04-16 RX ORDER — AMITRIPTYLINE HYDROCHLORIDE 25 MG/1
25 TABLET, FILM COATED ORAL NIGHTLY PRN
Qty: 30 TABLET | Refills: 0 | Status: SHIPPED | OUTPATIENT
Start: 2022-04-16 | End: 2023-04-16

## 2022-04-16 RX ORDER — OXYCODONE HYDROCHLORIDE 5 MG/1
5 TABLET ORAL EVERY 6 HOURS PRN
Status: DISCONTINUED | OUTPATIENT
Start: 2022-04-16 | End: 2022-04-16

## 2022-04-16 RX ORDER — ACETAMINOPHEN 500 MG
500 TABLET ORAL EVERY 6 HOURS PRN
Qty: 30 TABLET | Refills: 0 | Status: SHIPPED | OUTPATIENT
Start: 2022-04-16

## 2022-04-16 RX ORDER — LIDOCAINE 50 MG/G
2 PATCH TOPICAL
Status: DISCONTINUED | OUTPATIENT
Start: 2022-04-16 | End: 2022-04-16 | Stop reason: HOSPADM

## 2022-04-16 RX ORDER — LIDOCAINE 50 MG/G
2 PATCH TOPICAL DAILY
Qty: 14 PATCH | Refills: 0 | Status: SHIPPED | OUTPATIENT
Start: 2022-04-16

## 2022-04-16 RX ORDER — AMITRIPTYLINE HYDROCHLORIDE 50 MG/1
50 TABLET, FILM COATED ORAL NIGHTLY
Status: DISCONTINUED | OUTPATIENT
Start: 2022-04-16 | End: 2022-04-16 | Stop reason: HOSPADM

## 2022-04-16 RX ADMIN — POTASSIUM CHLORIDE 20 MEQ: 20 TABLET, EXTENDED RELEASE ORAL at 08:04

## 2022-04-16 RX ADMIN — MORPHINE SULFATE 4 MG: 4 INJECTION INTRAVENOUS at 06:04

## 2022-04-16 RX ADMIN — CINACALCET 30 MG: 30 TABLET, FILM COATED ORAL at 08:04

## 2022-04-16 RX ADMIN — TORSEMIDE 20 MG: 20 TABLET ORAL at 08:04

## 2022-04-16 RX ADMIN — OXYCODONE HYDROCHLORIDE 10 MG: 10 TABLET ORAL at 09:04

## 2022-04-16 RX ADMIN — ENOXAPARIN SODIUM 40 MG: 100 INJECTION SUBCUTANEOUS at 08:04

## 2022-04-16 RX ADMIN — MORPHINE SULFATE 4 MG: 4 INJECTION INTRAVENOUS at 01:04

## 2022-04-16 RX ADMIN — THYROID, PORCINE 180 MG: 60 TABLET ORAL at 09:04

## 2022-04-16 RX ADMIN — COLCHICINE 0.6 MG: 0.6 TABLET, FILM COATED ORAL at 08:04

## 2022-04-16 RX ADMIN — ALLOPURINOL 300 MG: 300 TABLET ORAL at 08:04

## 2022-04-16 RX ADMIN — POLYETHYLENE GLYCOL 3350 17 G: 17 POWDER, FOR SOLUTION ORAL at 08:04

## 2022-04-16 RX ADMIN — LIDOCAINE 1 PATCH: 50 PATCH CUTANEOUS at 09:04

## 2022-04-16 RX ADMIN — LIDOCAINE 2 PATCH: 50 PATCH CUTANEOUS at 04:04

## 2022-04-16 RX ADMIN — PANTOPRAZOLE SODIUM 40 MG: 40 TABLET, DELAYED RELEASE ORAL at 08:04

## 2022-04-16 RX ADMIN — ESCITALOPRAM OXALATE 10 MG: 10 TABLET ORAL at 08:04

## 2022-04-16 RX ADMIN — SENNOSIDES AND DOCUSATE SODIUM 1 TABLET: 50; 8.6 TABLET ORAL at 08:04

## 2022-04-16 NOTE — PLAN OF CARE
Patient alert and able to voice needs. No sign of distress. Respirations even and unlabored. Abdomen soft and nontender. Bowels osunds present in all quadrants. C/o of abdomen pain. Prn pain med administered as ordered.

## 2022-04-16 NOTE — DISCHARGE SUMMARY
Discharge Summary  Hospital Medicine  [facility]      Attending Physician on Discharge: Alberto Medrano MD  Hospital Medicine Team: [facility] MED [facility]  Date of Admission:  4/14/2022     Date of Discharge:   04/16/2022    Code status: Full Code    Active Hospital Problems    Diagnosis  POA    *Acute cystitis with hematuria [N30.01]  Yes    Hypothyroidism [E03.9]  Yes    Morbid obesity with BMI of 60.0-69.9, adult [E66.01, Z68.44]  Not Applicable     Chronic    Right lower quadrant abdominal pain [R10.31]  Yes    Hyperparathyroidism, primary [E21.0]  Yes     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       Consults: General surgery     History of Present Illness:  46-year-old WF, history of Congestive Heart Failure status post pacer placement , obesity BMI (66). Hypothyroidism, Hyperparathyroidism, Gout, GERD presents with complaints of right lower quadrant abdominal pain.      She is followed for her care within the [facility].  She reports on April 12th, got out of bed and felt overall pain that she initially thought was hunger pains verses a muscular pain in the abdomen, the pain progressively got worsened Tuesday night at 10:00 p.m. she presented to [facility] Emergency Department, states that she had multiple tests done including lab studies and imaging was told that she had stones in her gallbladder and ultimately was discharged at 4:00 a.m. Wednesday morning.  By 10:00 a.m. her symptoms had gotten worsened pain was rated 10/10 so she return to the emergency department, reporting that she underwent further testing, kidney stones noted in left kidney, also has been told she has urinary tract her bladder infection and was prescribed oral antibiotic, oral Toradol, Norco and again discharge.   A she was advised to see a urologist to she saw earlier on Thursday who reported that he did not have any other management or treatment to offer patient at this time, and he did not believe patient has right-sided pain may be due to left-sided kidney stones.     [facility] hoping for further assistance in this workup.  She has undergone CT abdomen pelvis that demonstrates sludge in the gallbladder without discrete stones no biliary ductal abnormalities, and left-sided nonobstructive kidney stones.  General surgery had evaluated patient recommends no operative intervention at this time based on findings.  Patient has no abnormal cholestatic markers on lab workup and a normal lipase.     Incidental finding on CT chest patient has a left lower lobe nodular finding read as possible infection verse malignancy or in inflammatory component, patient in notified of results states she has not been told of any abnormal chest findings on any prior CT scan of the chest but unclear when last examination management, as chest x-ray likely not able to did denote this discrete finding     ED treatment:  Ceftriaxone 1 g, Toradol 10 mg IV x1, morphine 4 mg IV x2, Zofran 4 mg, normal saline 250 cc x1     Patient Care Team:  Matthew Martel MD as PCP - General (Internal Medicine)  Salvatore Arellano MD as Consulting Physician (Endocrinology)  Alireza Parra MD as Consulting Physician (Cardiology)        Overview/Hospital Course:  Pain controlled and felt improved with IV morphine on first day of admission. However on the second day she was still taking doses of IV morphine for pain control, on exam her R flank and abdomen were tender in a dermatomal distribution and small crusted vesicles on a red base near her umbilicus and near her spine on the R side that were acutely tender with tenderness along the T10 dermatome. Her Ucx from [facility] grew mixed carmen, she was thought to have herpes zoster and was  "discharged with pain control.    Hospital Course by Problem:  Herpes Zoster  Patient with pain in T10 dermatome near umbilicus and pain in radiates from spine to umbilicus, does not cross the midline with associated crusted vesicles on a red base suspicious for herpes zoster activation. >72 hours since presentation makes it unlikely she will derive benefit from antiviral therapy. Will focus on pain control with tylenol, toradol, oxycodone, and amitriptyline (patient did not wish to use gabapentin or lyrica due to weight gain)  - Oxycodone 10mg Q6H (15 tablets given)  - Toradol from OSH  - Tylenol PRN  - Amitriptyline 25mg QHS  - Lidocaine patches PRN  - PCP follow up      UTI, possible pyelo  -patient has had 3 visits to hospitals, admitted due to persistent pain,initially thought to have pyelo on admission but then exam more consistent with shingles. Additionally, her Ucx at [facility] grew mixed carmen, no further treatment necessary        Morbid obesity with BMI of 60.0-69.9, adult  Body mass index is 67.95 kg/m². Morbid obesity complicates all aspects of disease management from diagnostic modalities to treatment. Weight loss encouraged and health benefits explained to patient.                   Hypothyroidism  Continue armour thyroid  -Endocrine follow up        Chronic congestive heart failure  -currently patient reports her weight is typically 375-380lbs - Euvolemic on exam and BNP WNL on admission  -admitted in February and weight was 415 lbs  -continue her home Torsemide dosing.   - Cards follow up for HF regimen optimization        Left nephrolithiasis  As above        Hyperparathyroidism, primary  -may be primary risk factor for patient developing recurrent nephrolithiasis.   -she follows with an ambulatory endocrinologist - is on cinacalcet          >continue Cinacalcet  -reports having "four parathyroid tumors" that she needs removed, possible she is referring to normal parathyroidectomy being recommended for " a diagnosis of primary hyperparathyroidism  - Endocrine follow up as outpatient           Right lower quadrant abdominal pain  Appears to be more pain along T10 dermatome, consistent with herpes zoster. See above  No abnormal cholestatic lab findings, no cholelithiasis on our CT scan - sludge noted.  Gen surgery evaluated patient and recommends HIDA scan as next diagnostic step if concern remained for possible biliary colic.     Left lower lobe nodule  Noted incidentally on CT chest, needs follow up as outpatient  - PCP follow up  .     Laboratory Values:  Recent Labs   Lab 04/14/22  1511 04/15/22  0410   WBC 10.85 10.67   HGB 10.9* 10.8*   HCT 34.0* 34.6*    269     Recent Labs   Lab 04/14/22  1511 04/15/22  0410   * 138   K 3.3* 3.6   CL 97 97   CO2 29 30*   BUN 10 9   CREATININE 0.6 0.7   * 121*   CALCIUM 9.7 10.0   MG  --  1.9   PHOS  --  2.6*   LIPASE 10  --      Recent Labs   Lab 04/14/22  1511 04/15/22  0410   ALKPHOS 133  --    ALT 10  --    AST 18  --    ALBUMIN 3.3* 3.1*   PROT 7.2  --    BILITOT 0.5  --       Recent Labs   Lab 04/15/22  0008   POCTGLUCOSE 117*     Recent Labs     04/14/22  1511 04/14/22  2116   TROPONINI 0.006 <0.006         Microbiology:  Microbiology Results (last 7 days)     Procedure Component Value Units Date/Time    Urine culture [[id number]]  (Abnormal) Collected: 04/14/22 1545    Order Status: Completed Specimen: Urine Updated: 04/16/22 1024     Urine Culture, Routine ENTEROCOCCUS SPECIES  10,000 - 49,999 cfu/ml  Identification and susceptibility pending      Narrative:      Specimen Source->Urine    Gram stain [[id number]] Collected: 04/14/22 1545    Order Status: Completed Specimen: Urine from Clean Catch Updated: 04/15/22 0225     Gram Stain Result Rare epithelial cells      Rare WBC's      Rare Gram negative rods      Rare Gram positive rods      Rare Gram positive cocci    Narrative:      add on test urine gram stain per dr valeria ortez order#  [id number]   04/15/2022  02:07     Gram stain [[id number]]     Order Status: Completed Specimen: Urine           Imaging:  Imaging Results           CT Chest Without Contrast (Final result)  Result time 04/14/22 18:01:44    Final result by Jacobo Rae MD (04/14/22 18:01:44)                 Impression:      This report was flagged in Epic as abnormal.    1. Nodular focus within the left lower lobe.  Given air bronchograms, this could reflect small focus of infectious consolidation however malignancy remains of primary concern and follow-up/further evaluation is warranted.  2. Please see separately dictated report for details of the abdomen and pelvis.  3. Additional findings above.      Electronically signed by: Jacobo Rae MD  Date:    04/14/2022  Time:    18:01             Narrative:    EXAMINATION:  CT CHEST WITHOUT CONTRAST    CLINICAL HISTORY:  left hilar mass;    TECHNIQUE:  Low dose axial images, sagittal and coronal reformations were obtained from the thoracic inlet to the lung bases. Contrast was not administered.    COMPARISON:  CT 04/14/2022    FINDINGS:  The structures at the base of the neck are unremarkable.  There are several scattered upper limit of normal caliber mediastinal lymph nodes.  The heart is not enlarged.  No pericardial effusion.  The thoracic aorta tapers normally.  The visualized portions of the spleen, pancreas and adrenal glands are unremarkable.  There is hepatic steatosis, correlation with LFTs recommended.  See separately dictated report for details of the abdomen.    The airways are patent.  Motion artifact limits evaluation of the pulmonary parenchyma.  Additionally, artifact from spinal fusion hardware limits evaluation of the parenchyma.  There is a 2-3 mm pulmonary nodule within the left upper lobe, pleural based.  There is a nodular focus with apparent air bronchograms within the left lower lobe measuring 3.5 cm.  There is a 3-4 mm pulmonary nodule within the right  upper lobe.  No pneumothorax.  No pleural effusion.    Degenerative changes are noted of the spine.  Note appearing fracture is noted of left lateral rib 7.  No significant axillary lymphadenopathy.  Multilevel spinal fusion hardware noted.                               X-Ray Chest PA And Lateral (Final result)  Result time 04/14/22 16:17:47    Final result by Lewis Edmondson MD (04/14/22 16:17:47)                 Impression:      1. Approximately 2.2 cm left suprahilar airspace opacity either representing a mass or and infiltrate.  Further evaluation with a dedicated CT scan of the chest is suggested.  2. Prominence of right hilum as well as interstitial lung markings bilaterally as above.  Interstitial edema or interstitial infiltrates not totally excluded.      Electronically signed by: Lewis Edmondson MD  Date:    04/14/2022  Time:    16:17             Narrative:    EXAMINATION:  XR CHEST PA AND LATERAL    CLINICAL HISTORY:  Chest pain, unspecified    TECHNIQUE:  PA and lateral views of the chest were performed.    COMPARISON:  None    FINDINGS:  There are no prior chest radiographs for comparison at this time.  Heart size is within normal limits.  There is no tracheal abnormalities.  Instrumented posterior fusion likely related to scoliosis surgery with a Love hortencia that extends from the cervicothoracic region to at least T12 vertebral body level.  No tracheal abnormalities.    There is a ill-defined airspace opacity in the left suprahilar region measuring at least 2.6 cm in its diameter.  Either a localized infiltrate or a suprahilar mass not totally excluded.    There is also prominence of the right hilar region, likely vascular in nature.  Increased interstitial lung markings in the right lung either representing interstitial infiltrates or interstitial edema.  Milder prominence of the left infrahilar interstitial lung markings.    No pleural effusions.  There is no pneumothorax.                                 CT Abdomen Pelvis With Contrast (Final result)  Result time 04/14/22 16:21:30    Final result by James Cordero MD (04/14/22 16:21:30)                 Impression:      Hepatosplenomegaly and diffuse hepatic steatosis.    Biliary sludge and/or cholelithiasis.  No evidence of cholecystitis.    Nonobstructing left renal stones, including a 4 mm stone at the left ureteropelvic junction.  No hydronephrosis.    Panniculitis.  No organized fluid collections.    Diastasis recti and small fat containing umbilical hernia.    Subacute fracture of the left 7th rib.  No acute fractures.    This report was flagged in Epic as abnormal.      Electronically signed by: James Cordero  Date:    04/14/2022  Time:    16:21             Narrative:    EXAMINATION:  CT ABDOMEN PELVIS WITH CONTRAST    CLINICAL HISTORY:  Abdominal pain, acute, nonlocalized;    TECHNIQUE:  Low dose axial images, sagittal and coronal reformations were obtained from the lung bases to the pubic symphysis following the IV administration of 100 mL of Omnipaque 350 .  Oral contrast was not given.    COMPARISON:  None.    FINDINGS:  LUNG BASES: Partially imaged metallic device in the region of the right ventricle, possibly a lead less pacemaker.    HEPATOBILIARY: Hepatomegaly measuring 21.4 cm in craniocaudal dimension.  Diffuse hepatic steatosis.  No focal hepatic lesions.  Layering hyperdense material within the gallbladder.  No gallbladder distension or wall thickening.  No biliary ductal dilatation.    SPLEEN: Splenomegaly measuring 17.4 cm.    PANCREAS: No focal masses or ductal dilatation.    ADRENALS: No adrenal nodules.    KIDNEYS/URETERS: There is a 4 mm stone at the left ureteropelvic junction (2:81).  Smaller nonobstructing stones in the left kidney.  No right-sided stones.  No hydronephrosis.  No solid renal mass.    PELVIC ORGANS/BLADDER: Unremarkable.    PERITONEUM / RETROPERITONEUM: No free air or fluid.    LYMPH NODES: No lymphadenopathy.    VESSELS:  Unremarkable.    GI TRACT: No distention or wall thickening.  Appendix not visualized, however there are no secondary signs of appendicitis.    BONES AND SOFT TISSUES: Skin thickening and subcutaneous edema in the pannus.  No organized fluid collections.  Diastasis recti.  Small fat containing umbilical hernia.  Thoracic dextrocurvature.  Partially imaged postoperative changes from lower thoracic instrumented fusion.  Subacute fracture of the left lateral 7th rib.  No acute fractures.  Degenerative changes.  Punctate sclerotic lesions in the right proximal femur, favored benign.                                      Cardiac:  ECG Results          EKG 12-lead (Final result)  Result time 04/15/22 09:27:37    Final result by Interface, Lab In [facility] (04/15/22 09:27:37)                 Narrative:    Test Reason :     Vent. Rate : 089 BPM     Atrial Rate : 089 BPM     P-R Int : 160 ms          QRS Dur : 086 ms      QT Int : 414 ms       P-R-T Axes : 069 047 038 degrees     QTc Int : 503 ms    Normal sinus rhythm  Nonspecific ST abnormality  Prolonged QT  Abnormal ECG  When compared with ECG of 14-APR-2022 15:16,  No significant change was found  Confirmed by Troy MAYO MD ([id number]) on 4/15/2022 9:27:26 AM    Referred By: [email or web address]   SELF           Confirmed By:Troy MAYO MD                             EKG 12-lead (Final result)  Result time 04/14/22 15:40:10    Final result by Interface, Lab In [facility] (04/14/22 15:40:10)                 Narrative:    Test Reason : R07.9,    Vent. Rate : 083 BPM     Atrial Rate : 083 BPM     P-R Int : 164 ms          QRS Dur : 088 ms      QT Int : 426 ms       P-R-T Axes : 048 036 036 degrees     QTc Int : 500 ms    Normal sinus rhythm  Prolonged QT  Abnormal ECG  No previous ECGs available  Confirmed by Troy MAYO MD ([id number]) on 4/14/2022 3:40:02 PM    Referred By:             Confirmed By:Troy MAYO MD                              No results found for this or any previous  visit.        Procedures:   * No surgery found *        Current Discharge Medication List      START taking these medications    Details   acetaminophen (TYLENOL) 500 MG tablet Take 1 tablet (500 mg total) by mouth every 6 (six) hours as needed for Pain.  Qty: 30 tablet, Refills: 0      amitriptyline (ELAVIL) 25 MG tablet Take 1 tablet (25 mg total) by mouth nightly as needed for Pain.  Qty: 30 tablet, Refills: 0      LIDOcaine (LIDODERM) 5 % Place 2 patches onto the skin once daily. Remove & Discard patch within 12 hours or as directed by MD  Qty: 14 patch, Refills: 0      oxyCODONE (ROXICODONE) 10 mg Tab immediate release tablet Take 1 tablet (10 mg total) by mouth every 6 (six) hours as needed for Pain.  Qty: 15 tablet, Refills: 0    Comments: Quantity prescribed more than 7 day supply? No      polyethylene glycol (GLYCOLAX) 17 gram/dose powder Mix 1 capful (17 g) with a liquid and take by mouth once daily.  Qty: 238 g, Refills: 0         CONTINUE these medications which have NOT CHANGED    Details   allopurinoL (ZYLOPRIM) 300 MG tablet Take 300 mg by mouth once daily.      ARMOUR THYROID 180 mg Tab Take 1 tablet by mouth once daily.      cefUROXime (CEFTIN) 500 MG tablet Take 500 mg by mouth 2 (two) times daily.      cinacalcet (SENSIPAR) 30 MG Tab Take 30 mg by mouth 2 (two) times daily.      EScitalopram oxalate (LEXAPRO) 10 MG tablet Take 10 mg by mouth once daily.      omeprazole (PRILOSEC) 40 MG capsule Take 40 mg by mouth once daily.      phentermine (ADIPEX-P) 37.5 mg tablet Take 37.5 mg by mouth once daily.      potassium chloride (K-TAB) 20 mEq Take 20 mEq by mouth once daily.      torsemide (DEMADEX) 20 MG Tab Take 20 mg by mouth 2 (two) times a day.      colchicine (MITIGARE) 0.6 mg Cap Take 1 capsule by mouth 2 (two) times daily.      ketorolac (TORADOL) 10 mg tablet Take 10 mg by mouth every 6 (six) hours as needed.         STOP taking these medications       HYDROcodone-acetaminophen (NORCO) 5-325  mg per tablet Comments:   Reason for Stopping:         ibuprofen (ADVIL,MOTRIN) 200 MG tablet Comments:   Reason for Stopping:                 Discharge Diet:2 gram sodium diet with Normal Fluid intake of 1500 - 2000 mL per day    Activity: activity as tolerated    Discharge Condition: Stable    Disposition: Home or Self Care    Follow up:        Tests pending at the time of discharge: none        Time spent  on the discharge of the patient including review of hospital course with the patient. reviewing discharge medications and arranging follow-up care: >30 mins    Discharge examination: Patient was seen and examined on the date of discharge and determined to be suitable for discharge.        Alberto Medrano M.D.  Department of Hospital Medicine  [facility]

## 2022-04-16 NOTE — DISCHARGE INSTRUCTIONS
Dear Genesis Amezcua,    You were admitted for severe pain on the right side of your flank an abdomen. We did a CT scan which did not reveal the source of your pain. It did reveal a kidney stone on your left. The kidney stone is not causing your pain.    There was some concern for a urinary tract infection and possible kidney infection, but it does not seem this is causing the pain either, however please finish your course of antibiotics prescribed at [facility] as your urine was concerning for a possible urinary tract infection.    We noted several crusted lesions on your back and abdomen in a distribution concerning for shingles. Your lesions are no longer infectious and based on their appearance antiviral medication will not be of significant benefit. The main goal is now pain control. We have prescribed amitriptyline at night and oxycodone during the day as well as lidocaine patches. You can also use tylenol or the toradol you were given at [facility] as needed. Shingles pain can last several weeks, you should follow up with your primary care doctor and they can prescribe any further pain medications as needed.    If you have fevers, develop pain in new areas, or further similar rashes please call your primary care doctor as you may need antiviral medication at that point.

## 2022-04-18 ENCOUNTER — TELEPHONE (OUTPATIENT)
Dept: HEPATOLOGY | Facility: HOSPITAL | Age: 47
End: 2022-04-18
Payer: MEDICAID

## 2022-04-18 DIAGNOSIS — N30.01 ACUTE CYSTITIS WITH HEMATURIA: Primary | ICD-10-CM

## 2022-04-18 LAB — BACTERIA UR CULT: ABNORMAL

## 2022-04-18 RX ORDER — NITROFURANTOIN 25; 75 MG/1; MG/1
100 CAPSULE ORAL 2 TIMES DAILY
Qty: 10 CAPSULE | Refills: 0 | Status: SHIPPED | OUTPATIENT
Start: 2022-04-18 | End: 2022-04-23

## 2022-04-18 NOTE — TELEPHONE ENCOUNTER
"On 4/18 Urine cx grew 50k CFU Enterococcus, sensitive to nitrofurantoin. This organism likely not well covered by cefuroxime prescribed at [facility]. As such patient was called and she is reporting "irritation" with urination. Instructed patient to stop cefuroxime and start nitrofurantoin.    Impression  Enterococcus UTI, not treated with cefuroxmie    Plan  - stop cefuroxime  - Start macrobid 100mg BID x5 days    Alberto Medrano M.D.  Department of Hospital Medicine  [facility]  Pager: [phone]  Spect: [phone]    "

## 2022-07-10 ENCOUNTER — HOSPITAL ENCOUNTER (EMERGENCY)
Facility: HOSPITAL | Age: 47
Discharge: HOME | End: 2022-07-11
Attending: EMERGENCY MEDICINE
Payer: MEDICAID

## 2022-07-10 DIAGNOSIS — R06.02 SHORTNESS OF BREATH: ICD-10-CM

## 2022-07-10 DIAGNOSIS — R07.9 CHEST PAIN: ICD-10-CM

## 2022-07-10 DIAGNOSIS — R06.02 SOB (SHORTNESS OF BREATH): Primary | ICD-10-CM

## 2022-07-10 LAB
ALBUMIN SERPL BCP-MCNC: 3.3 G/DL (ref 3.5–5.2)
ALP SERPL-CCNC: 128 U/L (ref 55–135)
ALT SERPL W/O P-5'-P-CCNC: 13 U/L (ref 10–44)
ANION GAP SERPL CALC-SCNC: 12 MMOL/L (ref 8–16)
AST SERPL-CCNC: 20 U/L (ref 10–40)
BASOPHILS # BLD AUTO: 0.05 K/UL (ref 0–0.2)
BASOPHILS NFR BLD: 0.4 % (ref 0–1.9)
BILIRUB SERPL-MCNC: 0.5 MG/DL (ref 0.1–1)
BNP SERPL-MCNC: 18 PG/ML (ref 0–99)
BUN SERPL-MCNC: 12 MG/DL (ref 6–20)
CALCIUM SERPL-MCNC: 9.8 MG/DL (ref 8.7–10.5)
CHLORIDE SERPL-SCNC: 102 MMOL/L (ref 95–110)
CO2 SERPL-SCNC: 24 MMOL/L (ref 23–29)
CREAT SERPL-MCNC: 0.6 MG/DL (ref 0.5–1.4)
D DIMER PPP IA.FEU-MCNC: 0.57 MG/L FEU
DIFFERENTIAL METHOD: ABNORMAL
EOSINOPHIL # BLD AUTO: 0 K/UL (ref 0–0.5)
EOSINOPHIL NFR BLD: 0 % (ref 0–8)
ERYTHROCYTE [DISTWIDTH] IN BLOOD BY AUTOMATED COUNT: 15.6 % (ref 11.5–14.5)
EST. GFR  (AFRICAN AMERICAN): >60 ML/MIN/1.73 M^2
EST. GFR  (NON AFRICAN AMERICAN): >60 ML/MIN/1.73 M^2
GLUCOSE SERPL-MCNC: 125 MG/DL (ref 70–110)
HCT VFR BLD AUTO: 35 % (ref 37–48.5)
HGB BLD-MCNC: 11.2 G/DL (ref 12–16)
IMM GRANULOCYTES # BLD AUTO: 0.18 K/UL (ref 0–0.04)
IMM GRANULOCYTES NFR BLD AUTO: 1.6 % (ref 0–0.5)
INFLUENZA A, MOLECULAR: NEGATIVE
INFLUENZA B, MOLECULAR: NEGATIVE
LYMPHOCYTES # BLD AUTO: 1.5 K/UL (ref 1–4.8)
LYMPHOCYTES NFR BLD: 13.3 % (ref 18–48)
MCH RBC QN AUTO: 25.5 PG (ref 27–31)
MCHC RBC AUTO-ENTMCNC: 32 G/DL (ref 32–36)
MCV RBC AUTO: 80 FL (ref 82–98)
MONOCYTES # BLD AUTO: 0.6 K/UL (ref 0.3–1)
MONOCYTES NFR BLD: 5 % (ref 4–15)
NEUTROPHILS # BLD AUTO: 9.2 K/UL (ref 1.8–7.7)
NEUTROPHILS NFR BLD: 79.7 % (ref 38–73)
NRBC BLD-RTO: 0 /100 WBC
PLATELET # BLD AUTO: 270 K/UL (ref 150–450)
PMV BLD AUTO: 10 FL (ref 9.2–12.9)
POTASSIUM SERPL-SCNC: 3.8 MMOL/L (ref 3.5–5.1)
PROT SERPL-MCNC: 7.2 G/DL (ref 6–8.4)
RBC # BLD AUTO: 4.4 M/UL (ref 4–5.4)
SARS-COV-2 RDRP RESP QL NAA+PROBE: NEGATIVE
SODIUM SERPL-SCNC: 138 MMOL/L (ref 136–145)
SPECIMEN SOURCE: NORMAL
TROPONIN I SERPL DL<=0.01 NG/ML-MCNC: 0.01 NG/ML (ref 0–0.03)
WBC # BLD AUTO: 11.58 K/UL (ref 3.9–12.7)

## 2022-07-10 PROCEDURE — 80053 COMPREHEN METABOLIC PANEL: CPT | Performed by: EMERGENCY MEDICINE

## 2022-07-10 PROCEDURE — 85379 FIBRIN DEGRADATION QUANT: CPT | Performed by: EMERGENCY MEDICINE

## 2022-07-10 PROCEDURE — 85025 COMPLETE CBC W/AUTO DIFF WBC: CPT | Performed by: EMERGENCY MEDICINE

## 2022-07-10 PROCEDURE — 99285 EMERGENCY DEPT VISIT HI MDM: CPT | Mod: 25

## 2022-07-10 PROCEDURE — 93005 ELECTROCARDIOGRAM TRACING: CPT

## 2022-07-10 PROCEDURE — 84484 ASSAY OF TROPONIN QUANT: CPT | Performed by: EMERGENCY MEDICINE

## 2022-07-10 PROCEDURE — U0002 COVID-19 LAB TEST NON-CDC: HCPCS | Performed by: EMERGENCY MEDICINE

## 2022-07-10 PROCEDURE — 83880 ASSAY OF NATRIURETIC PEPTIDE: CPT | Performed by: EMERGENCY MEDICINE

## 2022-07-10 PROCEDURE — 25000003 PHARM REV CODE 250: Performed by: EMERGENCY MEDICINE

## 2022-07-10 PROCEDURE — 93010 ELECTROCARDIOGRAM REPORT: CPT | Mod: ,,, | Performed by: INTERNAL MEDICINE

## 2022-07-10 PROCEDURE — 93010 EKG 12-LEAD: ICD-10-PCS | Mod: ,,, | Performed by: INTERNAL MEDICINE

## 2022-07-10 PROCEDURE — 87502 INFLUENZA DNA AMP PROBE: CPT | Performed by: EMERGENCY MEDICINE

## 2022-07-10 RX ORDER — OXYCODONE AND ACETAMINOPHEN 5; 325 MG/1; MG/1
1 TABLET ORAL
Status: COMPLETED | OUTPATIENT
Start: 2022-07-10 | End: 2022-07-10

## 2022-07-10 RX ADMIN — OXYCODONE HYDROCHLORIDE AND ACETAMINOPHEN 1 TABLET: 5; 325 TABLET ORAL at 10:07

## 2022-07-11 VITALS
HEIGHT: 63 IN | SYSTOLIC BLOOD PRESSURE: 134 MMHG | BODY MASS INDEX: 51.91 KG/M2 | DIASTOLIC BLOOD PRESSURE: 79 MMHG | HEART RATE: 85 BPM | WEIGHT: 293 LBS | RESPIRATION RATE: 20 BRPM | TEMPERATURE: 100 F | OXYGEN SATURATION: 95 %

## 2022-07-11 LAB
ALLENS TEST: ABNORMAL
DELSYS: ABNORMAL
HCO3 UR-SCNC: 27.8 MMOL/L (ref 24–28)
HCT VFR BLD CALC: 35 %PCV (ref 36–54)
HGB BLD-MCNC: 12 G/DL
PCO2 BLDA: 42.8 MMHG (ref 35–45)
PH SMN: 7.42 [PH] (ref 7.35–7.45)
PO2 BLDA: 53 MMHG (ref 40–60)
POC BE: 3 MMOL/L
POC SATURATED O2: 87 % (ref 95–100)
POC TCO2: 29 MMOL/L (ref 24–29)
POTASSIUM BLD-SCNC: 3.8 MMOL/L (ref 3.5–5.1)
SAMPLE: ABNORMAL
SODIUM BLD-SCNC: 137 MMOL/L (ref 136–145)

## 2022-07-11 PROCEDURE — 82803 BLOOD GASES ANY COMBINATION: CPT

## 2022-07-11 PROCEDURE — 99900035 HC TECH TIME PER 15 MIN (STAT)

## 2022-07-11 PROCEDURE — 25500020 PHARM REV CODE 255: Performed by: EMERGENCY MEDICINE

## 2022-07-11 RX ORDER — GUAIFENESIN, PSEUDOEPHEDRINE HYDROCHLORIDE 600; 60 MG/1; MG/1
1 TABLET, EXTENDED RELEASE ORAL 2 TIMES DAILY
Qty: 20 TABLET | Refills: 0 | Status: SHIPPED | OUTPATIENT
Start: 2022-07-11 | End: 2022-07-21

## 2022-07-11 RX ADMIN — IOHEXOL 100 ML: 350 INJECTION, SOLUTION INTRAVENOUS at 12:07

## 2022-07-11 NOTE — ED NOTES
Pt in the semi- mckinney's position, A & O x 3, w/out complaint at this time. Pt denies SOB, respirations are even and unlabored. Skin is warm dry and pink. VSS. Will continue to monitor closely.

## 2022-07-11 NOTE — ED NOTES
Pt sitting up on stretcher, A & O x 3. No distress noted. Respirations are even and unlabored, no nasal flaring and no use of accessory muscles. Skin is warm, dry and pink. VSS. Will continue to monitor closely.

## 2022-07-11 NOTE — ED NOTES
"Pt c/o "smothering" x 1 week w/ localized chest pain to the mid- sternum, L side w/ reproducable pain, worse w/ movement and deep breathing. Pt also c/o numbness to both arms, cough, nausea, weakness, dizziness and nasal congestion. Pt states she was at [facility] 2 days ago and given a diuretic. Pt states she urinated"it out" and was sent home. Pt states she still feels like "fluid overload". Pt A & O x 3, denies SOB, fever, chills, cough and N/V/D. Respirations are even and unlabored. Skin is warm, dry and pink. VS. SURYA x 3mm. BBS- decreased to all lobes bilaterally. Abd- SNT. Pt states her abd is swollen w/ fluid. PSM x 4 exts. Pt is connected to the pulse ox, B/P cuff and EKG monitor. Bed is locked and in the low position w/ the side rails up and locked for pt safety. Call bell @ the BS. Will continue to monitor closely.  "

## 2022-07-11 NOTE — ED NOTES
Pt is A & O x 3, denies distress @ this time, states some pain is still present. Pt denies SOB, respirations are even and unlabored. Skin remains warm, dry and pink. VS. Will continue to monitor closely.

## 2023-06-01 NOTE — ED PROVIDER NOTES
"Encounter Date: 7/10/2022       History     Chief Complaint   Patient presents with    Chest Pain    Shortness of Breath     Patient states mid-sternal chest tightness x 3 days with sharp pains at sternum - states she feels like she is "smothering" also complains of abdominal distention and extra fluid - mild nausea without vomiting - positive cough - denies fevers     46-year-old past medical history of CHF status post pacemaker, hypothyroidism,FARIDEH with CPAP at night, hyperparathyroidism, GERD, obesity, gout presenting with 1 week of worsening shortness of breath.  Patient additionally mentions having abdominal distension and swelling lower extremities.  Patient mentions having sharp chest pain center of chest nonradiating no aggravating or alleviating factors records as 6/10.  Patient recently went to outside ED was evaluated given Lasix and DC to home from ED.  + cough, congestion as well.  Had negative COVID tests earlier this week.             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  No family history on file.     Review of Systems   Constitutional: Negative for fever.   HENT: Positive for congestion.    Respiratory: Positive for cough and shortness of breath.    Cardiovascular: Positive for leg swelling. Negative for chest pain.   Gastrointestinal: Negative for abdominal pain and nausea.   Genitourinary: Negative for dysuria.   Musculoskeletal: Negative.    Skin: Negative for color change.   Neurological: Negative for dizziness.   Psychiatric/Behavioral: Negative.        Physical Exam     Initial Vitals [07/10/22 2105] "   BP Pulse Resp Temp SpO2   (!) 141/87 96 (!) 36 99.6 °F (37.6 °C) 96 %      MAP       --         Physical Exam    Constitutional: She appears well-developed and well-nourished. She is not diaphoretic. She appears distressed.   HENT:   Head: Normocephalic and atraumatic.   Eyes: Conjunctivae and EOM are normal. Pupils are equal, round, and reactive to light. Right eye exhibits no discharge. Left eye exhibits no discharge. No scleral icterus.   Neck: Neck supple.   Normal range of motion.  Cardiovascular: Normal rate and regular rhythm. Exam reveals no friction rub.    No murmur heard.  Pulmonary/Chest: Breath sounds normal. No respiratory distress. She has no wheezes. She has no rales.   Abdominal: Abdomen is soft. Bowel sounds are normal. She exhibits no distension. There is no abdominal tenderness. There is no rebound and no guarding.   Musculoskeletal:         General: Normal range of motion.      Cervical back: Normal range of motion and neck supple.     Neurological: She is alert and oriented to person, place, and time. No cranial nerve deficit or sensory deficit. GCS score is 15. GCS eye subscore is 4. GCS verbal subscore is 5. GCS motor subscore is 6.   Skin: Skin is warm and dry. No erythema. No pallor.   Psychiatric: She has a normal mood and affect. Her behavior is normal. Judgment and thought content normal.         ED Course   Procedures  Labs Reviewed   CBC W/ AUTO DIFFERENTIAL - Abnormal; Notable for the following components:       Result Value    Hemoglobin 11.2 (*)     Hematocrit 35.0 (*)     MCV 80 (*)     MCH 25.5 (*)     RDW 15.6 (*)     Immature Granulocytes 1.6 (*)     Gran # (ANC) 9.2 (*)     Immature Grans (Abs) 0.18 (*)     Gran % 79.7 (*)     Lymph % 13.3 (*)     All other components within normal limits   COMPREHENSIVE METABOLIC PANEL - Abnormal; Notable for the following components:    Glucose 125 (*)     Albumin 3.3 (*)     All other components within normal limits   D DIMER, QUANTITATIVE  - Abnormal; Notable for the following components:    D-Dimer 0.57 (*)     All other components within normal limits   ISTAT PROCEDURE - Abnormal; Notable for the following components:    POC SATURATED O2 87 (*)     POC Hematocrit 35 (*)     All other components within normal limits   INFLUENZA A & B BY MOLECULAR   TROPONIN I   B-TYPE NATRIURETIC PEPTIDE   SARS-COV-2 RNA AMPLIFICATION, QUAL        ECG Results          EKG 12-lead (Final result)  Result time 07/11/22 21:03:53    Final result by Interface, Lab In [facility] (07/11/22 21:03:53)                 Narrative:    Test Reason : R06.02,    Vent. Rate : 096 BPM     Atrial Rate : 096 BPM     P-R Int : 152 ms          QRS Dur : 088 ms      QT Int : 368 ms       P-R-T Axes : 047 035 025 degrees     QTc Int : 464 ms    Normal sinus rhythm  Normal ECG  When compared with ECG of 16-APR-2022 07:39,  No significant change was found  Confirmed by Waqar Anaya MD ([id number]) on 7/11/2022 9:03:44 PM    Referred By: [email or web address]   SELF           Confirmed By:Waqar Anaya MD                            Imaging Results          CTA Chest Non-Coronary (PE Study) (Final result)  Result time 07/11/22 00:27:14    Final result by Carmenza Martins MD (07/11/22 00:27:14)                 Impression:      No evidence of pulmonary emboli.    No acute lung parenchymal or pleural abnormality or cardiomegaly.    Thyroid nodule on the left.      Electronically signed by: Carmenza Martins  Date:    07/11/2022  Time:    00:27             Narrative:    EXAMINATION:  CTA CHEST NON CORONARY    CLINICAL HISTORY:  Pulmonary embolism (PE) suspected, positive D-dimer;    TECHNIQUE:  Low dose axial images, sagittal and coronal reformations were obtained from the thoracic inlet to the lung bases following the IV administration of 100 mL of Omnipaque 350.    COMPARISON:  None    FINDINGS:  Pulmonary vasculature: There are no pulmonary emboli.  Pulmonary arteries distribute normally and are normal in  caliber.  There are four pulmonary veins.    Miriam/Mediastinum: No pathologic jameson enlargement.    Airways: Patent.    Lungs/Pleura: There is near resolution of the nodular focus in the left lower lobe with just a small residual area of suspected scarring remaining.  There is no pleural effusion or thickening.    Aorta: Left-sided aortic arch.  No aneurysm and no significant atherosclerosis    Heart: Normal size. No effusion.    Base of Neck: Thyroid nodule in the left incidentally noted.  None al    Thoracic soft tissues: Unremarkable.    Esophagus: Unremarkable.    Upper Abdomen: There is hepatic steatosis.  No other abnormality of the partially imaged upper abdomen.    Bones: No acute fracture. No suspicious lytic or sclerotic lesions.                               X-Ray Chest AP Portable (Final result)  Result time 07/10/22 23:13:48    Final result by Jorge Cannon MD (07/10/22 23:13:48)                 Impression:      No acute intrathoracic process.    Resolution of previous left suprahilar airspace opacity.      Electronically signed by: Jorge Cannon MD  Date:    07/10/2022  Time:    23:13             Narrative:    EXAMINATION:  XR CHEST AP PORTABLE    CLINICAL HISTORY:  Chest Pain;    TECHNIQUE:  Single frontal view of the chest was performed.    COMPARISON:  04/14/2022.    FINDINGS:  There is stable appearance of the thoracic hardware.  Monitoring EKG leads are present.    The trachea is unremarkable.  The cardiomediastinal silhouette is within normal limits.  The hemidiaphragms unremarkable.  There are no pleural effusions.  There is no evidence of a pneumothorax.  There is no evidence of pneumomediastinum.  Previous left suprahilar density is no longer visualized.  There is no new airspace disease.  There are degenerative changes in the osseous structures.                                 Medications   oxyCODONE-acetaminophen 5-325 mg per tablet 1 tablet (1 tablet Oral Given 7/10/22 0599)   iohexoL  (OMNIPAQUE 350) injection 100 mL (100 mLs Intravenous Given 7/11/22 0008)     Medical Decision Making:   History:   Old Medical Records: I decided to obtain old medical records.  Initial Assessment:   46-year-old presenting with 1 week of worsening shortness of breath.  Differential Diagnosis:   Ddx includes but is not limited to:   ACS, CHF, pneumonia, pneumothorax, asthma , COPD. Considered PE however much lower likelihood at this time considering patient's history and presentation.    Clinical Tests:   Lab Tests: Ordered and Reviewed  Radiological Study: Ordered and Reviewed  Medical Tests: Reviewed and Ordered  ED Management:  Plan:  Obtain CBC, CMP, tropes, chest x-ray dimer and reassess.             ED Course as of 07/11/22 2228 Mon Jul 11, 2022   0110 Patient is well appearing.  Discussed labs and reviewed imaging with patient.  Safe for planned discharge. Given strict return precautions and follow-up instructions. Patient understands and has no further questions, will DC home.t having congestion and uncomfortable CPAP machine mask, likely contributory to SOB. Safe for planned d/c at this time.      [DC]      ED Course User Index  [DC] Murali Costello Jr., MD             Clinical Impression:   Final diagnoses:  [R06.02] Shortness of breath  [R07.9] Chest pain  [R06.02] SOB (shortness of breath) (Primary)          ED Disposition Condition    Discharge Stable        ED Prescriptions     Medication Sig Dispense Start Date End Date Auth. Provider    pseudoephedrine-guaiFENesin  mg (MUCINEX D)  mg per tablet Take 1 tablet by mouth 2 (two) times daily. for 10 days 20 tablet 7/11/2022 7/21/2022 Murali Costello Jr., MD        Follow-up Information     Follow up With Specialties Details Why Contact Info    Matthew Martel MD Internal Medicine, Family Medicine In 1 week  [address]  [address]  [facility] [id number] [phone]      [facility] Emergency Medicine  If symptoms worsen  [address] [id number]  [phone]           Murali Costello Jr., MD  07/11/22 7875     Yes